# Patient Record
Sex: MALE | Race: WHITE | NOT HISPANIC OR LATINO
[De-identification: names, ages, dates, MRNs, and addresses within clinical notes are randomized per-mention and may not be internally consistent; named-entity substitution may affect disease eponyms.]

---

## 2017-07-12 ENCOUNTER — APPOINTMENT (OUTPATIENT)
Dept: GASTROENTEROLOGY | Facility: CLINIC | Age: 57
End: 2017-07-12

## 2017-07-12 VITALS
WEIGHT: 176 LBS | SYSTOLIC BLOOD PRESSURE: 110 MMHG | HEART RATE: 71 BPM | TEMPERATURE: 97.6 F | BODY MASS INDEX: 24.55 KG/M2 | DIASTOLIC BLOOD PRESSURE: 70 MMHG | OXYGEN SATURATION: 97 %

## 2017-07-12 RX ORDER — POLYETHYLENE GLYCOL 3350, SODIUM SULFATE, SODIUM CHLORIDE, POTASSIUM CHLORIDE, ASCORBIC ACID, SODIUM ASCORBATE 7.5-2.691G
100 KIT ORAL
Qty: 1 | Refills: 0 | Status: ACTIVE | COMMUNITY
Start: 2017-07-12 | End: 1900-01-01

## 2017-10-26 ENCOUNTER — OUTPATIENT (OUTPATIENT)
Dept: OUTPATIENT SERVICES | Facility: HOSPITAL | Age: 57
LOS: 1 days | Discharge: HOME | End: 2017-10-26
Payer: MEDICAID

## 2017-10-26 ENCOUNTER — APPOINTMENT (OUTPATIENT)
Dept: GASTROENTEROLOGY | Facility: HOSPITAL | Age: 57
End: 2017-10-26

## 2017-10-26 ENCOUNTER — RESULT REVIEW (OUTPATIENT)
Age: 57
End: 2017-10-26

## 2017-10-26 DIAGNOSIS — K51.90 ULCERATIVE COLITIS, UNSPECIFIED, WITHOUT COMPLICATIONS: ICD-10-CM

## 2017-10-26 PROCEDURE — 45380 COLONOSCOPY AND BIOPSY: CPT | Mod: GC

## 2017-10-26 PROCEDURE — 88305 TISSUE EXAM BY PATHOLOGIST: CPT | Mod: 26

## 2017-10-30 LAB — SURGICAL PATHOLOGY STUDY: SIGNIFICANT CHANGE UP

## 2018-07-18 ENCOUNTER — RX RENEWAL (OUTPATIENT)
Age: 58
End: 2018-07-18

## 2018-10-18 ENCOUNTER — CHART COPY (OUTPATIENT)
Age: 58
End: 2018-10-18

## 2018-10-18 ENCOUNTER — MEDICATION RENEWAL (OUTPATIENT)
Age: 58
End: 2018-10-18

## 2018-10-18 ENCOUNTER — MESSAGE (OUTPATIENT)
Age: 58
End: 2018-10-18

## 2018-10-18 RX ORDER — MESALAMINE 375 MG/1
0.38 CAPSULE, EXTENDED RELEASE ORAL DAILY
Qty: 360 | Refills: 3 | Status: ACTIVE | COMMUNITY
Start: 2017-07-12 | End: 1900-01-01

## 2019-02-27 ENCOUNTER — APPOINTMENT (OUTPATIENT)
Dept: GASTROENTEROLOGY | Facility: CLINIC | Age: 59
End: 2019-02-27
Payer: MEDICAID

## 2019-02-27 VITALS
HEART RATE: 65 BPM | WEIGHT: 185 LBS | DIASTOLIC BLOOD PRESSURE: 62 MMHG | TEMPERATURE: 98 F | RESPIRATION RATE: 12 BRPM | HEIGHT: 71 IN | BODY MASS INDEX: 25.9 KG/M2 | SYSTOLIC BLOOD PRESSURE: 120 MMHG | OXYGEN SATURATION: 97 %

## 2019-02-27 PROCEDURE — 99214 OFFICE O/P EST MOD 30 MIN: CPT

## 2019-02-27 RX ORDER — MESALAMINE 375 MG/1
0.38 CAPSULE, EXTENDED RELEASE ORAL DAILY
Qty: 360 | Refills: 1 | Status: ACTIVE | COMMUNITY
Start: 2019-02-27 | End: 1900-01-01

## 2019-02-27 NOTE — REASON FOR VISIT
[Follow-Up: _____] : a [unfilled] follow-up visit [FreeTextEntry1] : for followup and treatment of ulcerative colitis.

## 2019-02-27 NOTE — PHYSICAL EXAM
[General Appearance - Alert] : alert [General Appearance - In No Acute Distress] : in no acute distress [General Appearance - Well Nourished] : well nourished [General Appearance - Well Developed] : well developed [General Appearance - Well-Appearing] : healthy appearing [Sclera] : the sclera and conjunctiva were normal [Oropharynx] : the oropharynx was normal [Neck Appearance] : the appearance of the neck was normal [Neck Cervical Mass (___cm)] : no neck mass was observed [Respiration, Rhythm And Depth] : normal respiratory rhythm and effort [Exaggerated Use Of Accessory Muscles For Inspiration] : no accessory muscle use [Auscultation Breath Sounds / Voice Sounds] : lungs were clear to auscultation bilaterally [Heart Rate And Rhythm] : heart rate was normal and rhythm regular [Heart Sounds] : normal S1 and S2 [Heart Sounds Gallop] : no gallops [Murmurs] : no murmurs [Heart Sounds Pericardial Friction Rub] : no pericardial rub [Edema] : there was no peripheral edema [Bowel Sounds] : normal bowel sounds [Abdomen Soft] : soft [Abdomen Tenderness] : non-tender [] : no hepato-splenomegaly [Abdomen Mass (___ Cm)] : no abdominal mass palpated [Abdomen Hernia] : no hernia was discovered [Cervical Lymph Nodes Enlarged Posterior Bilaterally] : posterior cervical [Cervical Lymph Nodes Enlarged Anterior Bilaterally] : anterior cervical [Supraclavicular Lymph Nodes Enlarged Bilaterally] : supraclavicular [No CVA Tenderness] : no ~M costovertebral angle tenderness [No Spinal Tenderness] : no spinal tenderness [Abnormal Walk] : normal gait [Nail Clubbing] : no clubbing  or cyanosis of the fingernails [Skin Color & Pigmentation] : normal skin color and pigmentation [Oriented To Time, Place, And Person] : oriented to person, place, and time [Impaired Insight] : insight and judgment were intact [Affect] : the affect was normal [Mood] : the mood was normal

## 2019-02-27 NOTE — ASSESSMENT
[FreeTextEntry1] : Impression:\par \par #1 ulcerative colitis- history of 34 years duration. History suggestive of mild ulcerative colitis. Never hospitalized. Never required steroids. Surveillance colonoscopy on 10/26/17 revealed normal colonic mucosa except for minimal focal superficial ulceration of the transverse colon. Currently asymptomatic and in remission.\par \par #2 status post RIH repair.

## 2019-02-27 NOTE — CONSULT LETTER
[Dear  ___] : Dear  [unfilled], [Consult Letter:] : I had the pleasure of evaluating your patient, [unfilled]. [Please see my note below.] : Please see my note below. [Consult Closing:] : Thank you very much for allowing me to participate in the care of this patient.  If you have any questions, please do not hesitate to contact me. [Sincerely,] : Sincerely, [Prem Gregory M.D.] : Prem Gregory M.D. [Division of Gastroenterology] : Division of Gastroenterology [[facility]] : [facility] [[address] : [address] [[place] [address]] : [place] [address] [FreeTextEntry2] : Dr. DANG Farias [FreeTextEntry3] : Prem Gregory M.D.

## 2019-02-27 NOTE — HISTORY OF PRESENT ILLNESS
[FreeTextEntry1] : Office revisit on 2/27/19.\par \par PREVIOUS HISTORY:\par \par     -History of ulcerative colitis of 30 years duration. Previously under the care of Dr. Kirk Garcia. Prompted by changes in his insurance patient now seeks followup under my care. Await records.\par     -Patient unclear as to extend of ulcerative colitis. Never hospitalized. Never on prednisone. Recalls no topical therapy. Previously on Asacol 8 mg t.i.d. Currently on Apriso 4 tabs daily.\par     -Describes last flare approximately 18 months ago. Experienced lower abdominal pain, cramps, loose stool and diarrhea but no blood. No change in treatment. Symptoms resolved.\par \par Had colonoscopy in 2012. Apparently some type of abnormal finding prompting consultation by Dr. Hernandez Santiago who did a chromoendoscopy. Patient unclear as to what the issue was-??dysplasia. Patient reports findings at followup colonoscopy were negative and no other intervention was recommended.\par \par Currently asymptomatic. Stable appetite and weight. Has 2-3 formed bowel movements daily without any complaints of diarrhea, constipation, change in bowel habit, melena, hematochezia or rectal bleeding.\par \par Denies oral ulcers, fever, dysphagia, heartburn, nausea, vomiting or abdominal pain.\par \par Denies any history of gallstones or kidney stones.\par \par Denies any history of extraintestinal manifestations.\par \par INTERVAL HISTORY:\par \par Currently on maintenance therapy with Apriso 4 tabs daily.\par \par Patient feels well. Asymptomatic from a GI standpoint. Has one to 2 formed bowel movements daily without any complaints of diarrhea, constipation, change in bowel habit or rectal bleeding. Appetite and weight are stable. Denies abdominal pain.\par \par Surveillance colonoscopy performed on 4/6/15. Colonic mucosa normal throughout except for focal superficial ulceration in sigmoid colon. Biopsies of the cecum were normal. Ascending colon biopsy revealed chronic colitis with focal activity. Hepatic flexure biopsies revealed chronic inactive colitis as a did biopsies in the transverse colon and sigmoid colon. Sigmoid colon biopsy revealed chronic active colitis with ulceration. Rectal biopsy revealed chronic inactive proctitis. Dysplasia was not detected at any site.\par \par Patient reports no new or interval change to medical history or medications.\par \par CURRENT HISTORY:\par \par ORV 7/12/17:    -Routine followup regarding history of ulcerative colitis.\par                           -Asymptomatic. He feels fine. Has one to 2 formed bowel movements daily without any complaints of diarrhea, constipation, change in bowel habit or rectal bleeding. Appetite and weight are stable. Denies any complaint of dysphagia, heartburn, nausea, vomiting or abdominal pain.\par                           -Patient is on Apriso 4 times q.d.\par                           -Patient reports normal labs recently obtained by PMD (Dr. DANG Farias [phone]).\par \par COLONOSCOPY 10/26/17:    -Surveillance colonoscopy was performed in 10/26/17. History of ulcerative colitis of 32 years duration. Total colonoscopy was performed to the cecum with ileoscopy. Normal terminal ileum. Normal colonic mucosa throughout except for minimal focal superficial ulceration in the transverse colon. Colonic biopsies from the ascending colon, transverse colon, descending colon, sigmoid colon and rectum were all normal without active colitis except for mildly active colitis focally noted in the transverse colon. Dysplasia was not detected in the site. Followup surveillance colonoscopy is advised in 2 years. \par \par ORV 2/27/19:    -Patient presents for office revisit regarding history of ulcerative colitis. Feels fine. Asymptomatic from a GI standpoint. He has one to 2 formed bowel movements daily without any complaints of diarrhea, constipation, change in bowel habit or rectal bleeding. Appetite and weight are stable. Denies any complaints of dysphagia, heartburn, nausea, vomiting or abdominal pain. Patient remains on maintenance Apriso 4 tabs q.d. Patient indicates that he had a routine followup appointment with his PMD one month ago at which time routine labs were normal.\par \par

## 2019-07-09 ENCOUNTER — MEDICATION RENEWAL (OUTPATIENT)
Age: 59
End: 2019-07-09

## 2019-07-09 RX ORDER — MESALAMINE 375 MG/1
0.38 CAPSULE, EXTENDED RELEASE ORAL DAILY
Qty: 360 | Refills: 1 | Status: ACTIVE | COMMUNITY
Start: 2019-07-09 | End: 1900-01-01

## 2019-08-28 ENCOUNTER — APPOINTMENT (OUTPATIENT)
Dept: GASTROENTEROLOGY | Facility: CLINIC | Age: 59
End: 2019-08-28
Payer: MEDICAID

## 2019-08-28 VITALS
HEIGHT: 71 IN | TEMPERATURE: 98.8 F | RESPIRATION RATE: 12 BRPM | OXYGEN SATURATION: 98 % | DIASTOLIC BLOOD PRESSURE: 80 MMHG | HEART RATE: 77 BPM | WEIGHT: 184 LBS | BODY MASS INDEX: 25.76 KG/M2 | SYSTOLIC BLOOD PRESSURE: 120 MMHG

## 2019-08-28 PROCEDURE — 99213 OFFICE O/P EST LOW 20 MIN: CPT

## 2019-08-28 RX ORDER — POLYETHYLENE GLYCOL 3350, SODIUM SULFATE, SODIUM CHLORIDE, POTASSIUM CHLORIDE, ASCORBIC ACID, SODIUM ASCORBATE 7.5-2.691G
100 KIT ORAL
Qty: 1 | Refills: 0 | Status: ACTIVE | COMMUNITY
Start: 2019-08-28 | End: 1900-01-01

## 2019-08-28 NOTE — CONSULT LETTER
[Dear  ___] : Dear  [unfilled], [Consult Letter:] : I had the pleasure of evaluating your patient, [unfilled]. [Please see my note below.] : Please see my note below. [Consult Closing:] : Thank you very much for allowing me to participate in the care of this patient.  If you have any questions, please do not hesitate to contact me. [Sincerely,] : Sincerely, [Prem Gregory M.D.] : Prem Gregory M.D. [Division of Gastroenterology] : Division of Gastroenterology [[facility]] : [facility] [[address] : [address] [[place], [place] [address]] : [place] [address] [FreeTextEntry2] : Dr. DANG Farias [FreeTextEntry3] : Prem Gregory M.D.

## 2019-08-28 NOTE — PHYSICAL EXAM
[General Appearance - Alert] : alert [General Appearance - In No Acute Distress] : in no acute distress [General Appearance - Well Nourished] : well nourished [General Appearance - Well Developed] : well developed [General Appearance - Well-Appearing] : healthy appearing [Sclera] : the sclera and conjunctiva were normal [Oropharynx] : the oropharynx was normal [Neck Appearance] : the appearance of the neck was normal [Neck Cervical Mass (___cm)] : no neck mass was observed [Exaggerated Use Of Accessory Muscles For Inspiration] : no accessory muscle use [Respiration, Rhythm And Depth] : normal respiratory rhythm and effort [Auscultation Breath Sounds / Voice Sounds] : lungs were clear to auscultation bilaterally [Heart Rate And Rhythm] : heart rate was normal and rhythm regular [Heart Sounds] : normal S1 and S2 [Heart Sounds Gallop] : no gallops [Murmurs] : no murmurs [Heart Sounds Pericardial Friction Rub] : no pericardial rub [Edema] : there was no peripheral edema [Bowel Sounds] : normal bowel sounds [Abdomen Tenderness] : non-tender [Abdomen Soft] : soft [] : no hepato-splenomegaly [Abdomen Mass (___ Cm)] : no abdominal mass palpated [Abdomen Hernia] : no hernia was discovered [Cervical Lymph Nodes Enlarged Posterior Bilaterally] : posterior cervical [Cervical Lymph Nodes Enlarged Anterior Bilaterally] : anterior cervical [Supraclavicular Lymph Nodes Enlarged Bilaterally] : supraclavicular [Abnormal Walk] : normal gait [Nail Clubbing] : no clubbing  or cyanosis of the fingernails [Skin Color & Pigmentation] : normal skin color and pigmentation [Oriented To Time, Place, And Person] : oriented to person, place, and time [Impaired Insight] : insight and judgment were intact [Affect] : the affect was normal [Mood] : the mood was normal

## 2019-08-28 NOTE — HISTORY OF PRESENT ILLNESS
[FreeTextEntry1] : Office revisit on 8/28/19.\par \par Patient presents for GI followup regarding history of ulcerative colitis.\par \par PREVIOUS HISTORY:\par \par     -History of ulcerative colitis of 30 years duration. Previously under the care of Dr. Kirk Garcia. Prompted by changes in his insurance patient now seeks followup under my care. Await records.\par     -Patient unclear as to extend of ulcerative colitis. Never hospitalized. Never on prednisone. Recalls no topical therapy. Previously on Asacol 8 mg t.i.d. Currently on Apriso 4 tabs daily.\par     -Describes last flare approximately 18 months ago. Experienced lower abdominal pain, cramps, loose stool and diarrhea but no blood. No change in treatment. Symptoms resolved.\par \par Had colonoscopy in 2012. Apparently some type of abnormal finding prompting consultation by Dr. Hernandez Santiago who did a chromoendoscopy. Patient unclear as to what the issue was-??dysplasia. Patient reports findings at followup colonoscopy were negative and no other intervention was recommended.\par \par Currently asymptomatic. Stable appetite and weight. Has 2-3 formed bowel movements daily without any complaints of diarrhea, constipation, change in bowel habit, melena, hematochezia or rectal bleeding.\par \par Denies oral ulcers, fever, dysphagia, heartburn, nausea, vomiting or abdominal pain.\par \par Denies any history of gallstones or kidney stones.\par \par Denies any history of extraintestinal manifestations.\par \par INTERVAL HISTORY:\par \par Surveillance colonoscopy performed on 4/6/15. Colonic mucosa normal throughout except for focal superficial ulceration in sigmoid colon. Biopsies of the cecum were normal. Ascending colon biopsy revealed chronic colitis with focal activity. Hepatic flexure biopsies revealed chronic inactive colitis as a did biopsies in the transverse colon and sigmoid colon. Sigmoid colon biopsy revealed chronic active colitis with ulceration. Rectal biopsy revealed chronic inactive proctitis. Dysplasia was not detected at any site.\par \par CURRENT HISTORY:\par \par ORV 7/12/17:    -Routine followup regarding history of ulcerative colitis.\par                           -Asymptomatic. He feels fine. Has one to 2 formed bowel movements daily without any complaints of diarrhea, constipation, change in bowel habit or rectal bleeding. Appetite and weight are stable. Denies any complaint of dysphagia, heartburn, nausea, vomiting or abdominal pain.\par                           -Patient is on Apriso 4 times q.d.\par                           -Patient reports normal labs recently obtained by PMD (Dr. DANG Farias [phone]).\par \par COLONOSCOPY 10/26/17:    -Surveillance colonoscopy was performed in 10/26/17. History of ulcerative colitis of 32 years duration. Total colonoscopy was performed to the cecum with ileoscopy. Normal terminal ileum. Normal colonic mucosa throughout except for minimal focal superficial ulceration in the transverse colon. Colonic biopsies from the ascending colon, transverse colon, descending colon, sigmoid colon and rectum were all normal without active colitis except for mildly active colitis focally noted in the transverse colon. Dysplasia was not detected in the site. Followup surveillance colonoscopy is advised in 2 years. \par \par ORV 2/27/19:    -Patient presents for office revisit regarding history of ulcerative colitis. Feels fine. Asymptomatic from a GI standpoint. He has one to 2 formed bowel movements daily without any complaints of diarrhea, constipation, change in bowel habit or rectal bleeding. Appetite and weight are stable. Denies any complaints of dysphagia, heartburn, nausea, vomiting or abdominal pain. Patient remains on maintenance Apriso 4 tabs q.d. Patient indicates that he had a routine followup appointment with his PMD one month ago at which time routine labs were normal.\par \par ORV 8/28/19:      -Patient presents for followup regarding history of ulcerative colitis. He feels fine. Currently asymptomatic. Appetite and weight are stable. Denies any complaints of dysphagia, heartburn, nausea, vomiting or abdominal pain. He has one to 2 formed bowel movements daily and reports no complaints of any diarrhea, constipation, change in bowel habit or rectal bleeding. The patient indicates normal routine labs by his PMD several weeks ago. He is on maintenance Apriso 4 tabs q.d.

## 2019-12-02 ENCOUNTER — APPOINTMENT (OUTPATIENT)
Dept: GASTROENTEROLOGY | Facility: HOSPITAL | Age: 59
End: 2019-12-02

## 2019-12-02 ENCOUNTER — OUTPATIENT (OUTPATIENT)
Dept: OUTPATIENT SERVICES | Facility: HOSPITAL | Age: 59
LOS: 1 days | Discharge: HOME | End: 2019-12-02
Payer: MEDICAID

## 2019-12-02 ENCOUNTER — RESULT REVIEW (OUTPATIENT)
Age: 59
End: 2019-12-02

## 2019-12-02 VITALS
TEMPERATURE: 98 F | SYSTOLIC BLOOD PRESSURE: 126 MMHG | WEIGHT: 184.97 LBS | RESPIRATION RATE: 16 BRPM | DIASTOLIC BLOOD PRESSURE: 84 MMHG | OXYGEN SATURATION: 96 % | HEART RATE: 79 BPM | HEIGHT: 71 IN

## 2019-12-02 VITALS
HEART RATE: 76 BPM | OXYGEN SATURATION: 97 % | RESPIRATION RATE: 15 BRPM | SYSTOLIC BLOOD PRESSURE: 118 MMHG | DIASTOLIC BLOOD PRESSURE: 80 MMHG

## 2019-12-02 DIAGNOSIS — Z98.890 OTHER SPECIFIED POSTPROCEDURAL STATES: Chronic | ICD-10-CM

## 2019-12-02 DIAGNOSIS — K51.90 ULCERATIVE COLITIS, UNSPECIFIED, WITHOUT COMPLICATIONS: ICD-10-CM

## 2019-12-02 PROCEDURE — 45380 COLONOSCOPY AND BIOPSY: CPT

## 2019-12-02 PROCEDURE — 88305 TISSUE EXAM BY PATHOLOGIST: CPT | Mod: 26

## 2019-12-02 RX ORDER — SODIUM CHLORIDE 9 MG/ML
1000 INJECTION, SOLUTION INTRAVENOUS
Refills: 0 | Status: DISCONTINUED | OUTPATIENT
Start: 2019-12-02 | End: 2019-12-18

## 2019-12-02 RX ADMIN — SODIUM CHLORIDE 30 MILLILITER(S): 9 INJECTION, SOLUTION INTRAVENOUS at 09:36

## 2019-12-05 LAB — SURGICAL PATHOLOGY STUDY: SIGNIFICANT CHANGE UP

## 2019-12-22 ENCOUNTER — MOBILE ON CALL (OUTPATIENT)
Age: 59
End: 2019-12-22

## 2020-01-27 LAB
25(OH)D3 SERPL-MCNC: 27.8 NG/ML
BASOPHILS # BLD AUTO: 0.06 K/UL
BASOPHILS NFR BLD AUTO: 0.9 %
CALPROTECTIN FECAL: 146 UG/G
CRP SERPL-MCNC: 0.39 MG/DL
EOSINOPHIL # BLD AUTO: 0.06 K/UL
EOSINOPHIL NFR BLD AUTO: 0.9 %
ERYTHROCYTE [SEDIMENTATION RATE] IN BLOOD BY WESTERGREN METHOD: 35 MM/HR
HCT VFR BLD CALC: 45.5 %
HGB BLD-MCNC: 13.6 G/DL
IMM GRANULOCYTES NFR BLD AUTO: 0.4 %
LYMPHOCYTES # BLD AUTO: 2.42 K/UL
LYMPHOCYTES NFR BLD AUTO: 35.1 %
MAN DIFF?: NORMAL
MCHC RBC-ENTMCNC: 20.2 PG
MCHC RBC-ENTMCNC: 29.9 GM/DL
MCV RBC AUTO: 67.5 FL
MONOCYTES # BLD AUTO: 0.5 K/UL
MONOCYTES NFR BLD AUTO: 7.2 %
NEUTROPHILS # BLD AUTO: 3.83 K/UL
NEUTROPHILS NFR BLD AUTO: 55.5 %
PLATELET # BLD AUTO: 252 K/UL
RBC # BLD: 6.74 M/UL
RBC # FLD: 17.1 %
WBC # FLD AUTO: 6.9 K/UL

## 2020-09-18 ENCOUNTER — RX RENEWAL (OUTPATIENT)
Age: 60
End: 2020-09-18

## 2021-09-22 ENCOUNTER — NON-APPOINTMENT (OUTPATIENT)
Age: 61
End: 2021-09-22

## 2021-09-22 ENCOUNTER — RX RENEWAL (OUTPATIENT)
Age: 61
End: 2021-09-22

## 2021-09-22 RX ORDER — MESALAMINE 0.38 G/1
0.38 CAPSULE, EXTENDED RELEASE ORAL
Qty: 360 | Refills: 1 | Status: ACTIVE | COMMUNITY
Start: 2020-09-18 | End: 1900-01-01

## 2021-10-04 ENCOUNTER — LABORATORY RESULT (OUTPATIENT)
Age: 61
End: 2021-10-04

## 2021-10-05 LAB
25(OH)D3 SERPL-MCNC: 36.6 NG/ML
ALBUMIN SERPL ELPH-MCNC: 4.7 G/DL
ALP BLD-CCNC: 55 U/L
ALT SERPL-CCNC: 37 U/L
ANION GAP SERPL CALC-SCNC: 13 MMOL/L
AST SERPL-CCNC: 20 U/L
BASOPHILS # BLD AUTO: 0.05 K/UL
BASOPHILS NFR BLD AUTO: 0.8 %
BILIRUB SERPL-MCNC: 0.8 MG/DL
BUN SERPL-MCNC: 22 MG/DL
CALCIUM SERPL-MCNC: 9.6 MG/DL
CHLORIDE SERPL-SCNC: 105 MMOL/L
CO2 SERPL-SCNC: 24 MMOL/L
CREAT SERPL-MCNC: 0.88 MG/DL
CRP SERPL-MCNC: <3 MG/L
EOSINOPHIL # BLD AUTO: 0.06 K/UL
EOSINOPHIL NFR BLD AUTO: 0.9 %
ERYTHROCYTE [SEDIMENTATION RATE] IN BLOOD BY WESTERGREN METHOD: 39 MM/HR
GLUCOSE SERPL-MCNC: 92 MG/DL
HCT VFR BLD CALC: 47 %
HGB BLD-MCNC: 14.1 G/DL
IMM GRANULOCYTES NFR BLD AUTO: 0.5 %
LYMPHOCYTES # BLD AUTO: 2.27 K/UL
LYMPHOCYTES NFR BLD AUTO: 35.1 %
MAN DIFF?: NORMAL
MCHC RBC-ENTMCNC: 20.5 PG
MCHC RBC-ENTMCNC: 30 GM/DL
MCV RBC AUTO: 68.4 FL
MONOCYTES # BLD AUTO: 0.57 K/UL
MONOCYTES NFR BLD AUTO: 8.8 %
NEUTROPHILS # BLD AUTO: 3.49 K/UL
NEUTROPHILS NFR BLD AUTO: 53.9 %
PLATELET # BLD AUTO: 255 K/UL
POTASSIUM SERPL-SCNC: 5 MMOL/L
PROT SERPL-MCNC: 7.6 G/DL
RBC # BLD: 6.87 M/UL
RBC # FLD: 17.8 %
SODIUM SERPL-SCNC: 142 MMOL/L
WBC # FLD AUTO: 6.47 K/UL

## 2021-10-06 PROBLEM — K52.9 NONINFECTIVE GASTROENTERITIS AND COLITIS, UNSPECIFIED: Chronic | Status: ACTIVE | Noted: 2019-12-02

## 2022-03-02 ENCOUNTER — APPOINTMENT (OUTPATIENT)
Dept: GASTROENTEROLOGY | Facility: CLINIC | Age: 62
End: 2022-03-02
Payer: MEDICAID

## 2022-03-02 VITALS
OXYGEN SATURATION: 98 % | DIASTOLIC BLOOD PRESSURE: 80 MMHG | BODY MASS INDEX: 25.06 KG/M2 | HEIGHT: 72 IN | HEART RATE: 73 BPM | TEMPERATURE: 97.5 F | SYSTOLIC BLOOD PRESSURE: 138 MMHG | WEIGHT: 185 LBS

## 2022-03-02 PROCEDURE — 99213 OFFICE O/P EST LOW 20 MIN: CPT

## 2022-03-02 RX ORDER — MESALAMINE 0.38 G/1
0.38 CAPSULE, EXTENDED RELEASE ORAL
Qty: 360 | Refills: 3 | Status: ACTIVE | COMMUNITY
Start: 2022-03-02 | End: 1900-01-01

## 2022-03-02 RX ORDER — PEG-3350, SODIUM SULFATE, SODIUM CHLORIDE, POTASSIUM CHLORIDE, SODIUM ASCORBATE AND ASCORBIC ACID 7.5-2.691G
100 KIT ORAL
Qty: 1 | Refills: 0 | Status: ACTIVE | COMMUNITY
Start: 2022-03-02 | End: 1900-01-01

## 2022-03-02 NOTE — PHYSICAL EXAM
[General Appearance - Alert] : alert [General Appearance - In No Acute Distress] : in no acute distress [General Appearance - Well Nourished] : well nourished [General Appearance - Well Developed] : well developed [General Appearance - Well-Appearing] : healthy appearing [Sclera] : the sclera and conjunctiva were normal [Neck Appearance] : the appearance of the neck was normal [Neck Cervical Mass (___cm)] : no neck mass was observed [Respiration, Rhythm And Depth] : normal respiratory rhythm and effort [Exaggerated Use Of Accessory Muscles For Inspiration] : no accessory muscle use [Auscultation Breath Sounds / Voice Sounds] : lungs were clear to auscultation bilaterally [Heart Rate And Rhythm] : heart rate was normal and rhythm regular [Heart Sounds] : normal S1 and S2 [Heart Sounds Gallop] : no gallops [Murmurs] : no murmurs [Heart Sounds Pericardial Friction Rub] : no pericardial rub [Edema] : there was no peripheral edema [Bowel Sounds] : normal bowel sounds [Abdomen Soft] : soft [Abdomen Tenderness] : non-tender [] : no hepato-splenomegaly [Abdomen Mass (___ Cm)] : no abdominal mass palpated [Abdomen Hernia] : no hernia was discovered [Cervical Lymph Nodes Enlarged Posterior Bilaterally] : posterior cervical [Cervical Lymph Nodes Enlarged Anterior Bilaterally] : anterior cervical [Supraclavicular Lymph Nodes Enlarged Bilaterally] : supraclavicular [Abnormal Walk] : normal gait [Nail Clubbing] : no clubbing  or cyanosis of the fingernails [Skin Color & Pigmentation] : normal skin color and pigmentation [Oriented To Time, Place, And Person] : oriented to person, place, and time [Impaired Insight] : insight and judgment were intact [Affect] : the affect was normal [Mood] : the mood was normal [FreeTextEntry1] : Mild diastases recti is noted.

## 2022-03-02 NOTE — CONSULT LETTER
[Dear  ___] : Dear  [unfilled], [Consult Letter:] : I had the pleasure of evaluating your patient, [unfilled]. [Please see my note below.] : Please see my note below. [Consult Closing:] : Thank you very much for allowing me to participate in the care of this patient.  If you have any questions, please do not hesitate to contact me. [Sincerely,] : Sincerely, [FreeTextEntry2] : Dr. DANG Farias [FreeTextEntry3] : Prem Gregory M.D.

## 2022-03-02 NOTE — HISTORY OF PRESENT ILLNESS
[FreeTextEntry1] : Office revisit on 3/2/2022.\par \par Patient presents for GI followup regarding history of ulcerative colitis.\par \par PREVIOUS HISTORY:\par \par     -History of ulcerative colitis of 30 years duration. Previously under the care of Dr. Kirk Garcia. Prompted by changes in his insurance patient now seeks followup under my care. Await records.\par     -Patient unclear as to extend of ulcerative colitis. Never hospitalized. Never on prednisone. Recalls no topical therapy. Previously on Asacol 8 mg t.i.d. Currently on Apriso 4 tabs daily.\par     -Describes last flare approximately 18 months ago. Experienced lower abdominal pain, cramps, loose stool and diarrhea but no blood. No change in treatment. Symptoms resolved.\par \par Had colonoscopy in 2012. Apparently some type of abnormal finding prompting consultation by Dr. Hernandez Santiago who did a chromoendoscopy. Patient unclear as to what the issue was-??dysplasia. Patient reports findings at followup colonoscopy were negative and no other intervention was recommended.\par \par Currently asymptomatic. Stable appetite and weight. Has 2-3 formed bowel movements daily without any complaints of diarrhea, constipation, change in bowel habit, melena, hematochezia or rectal bleeding.\par \par Denies oral ulcers, fever, dysphagia, heartburn, nausea, vomiting or abdominal pain.\par \par Denies any history of gallstones or kidney stones.\par \par Denies any history of extraintestinal manifestations.\par \par INTERVAL HISTORY:\par \par Surveillance colonoscopy performed on 4/6/15. Colonic mucosa normal throughout except for focal superficial ulceration in sigmoid colon. Biopsies of the cecum were normal. Ascending colon biopsy revealed chronic colitis with focal activity. Hepatic flexure biopsies revealed chronic inactive colitis as a did biopsies in the transverse colon and sigmoid colon. Sigmoid colon biopsy revealed chronic active colitis with ulceration. Rectal biopsy revealed chronic inactive proctitis. Dysplasia was not detected at any site.\par \par CURRENT HISTORY:\par \par ORV 7/12/17:    -Routine followup regarding history of ulcerative colitis.\par                           -Asymptomatic. He feels fine. Has one to 2 formed bowel movements daily without any complaints of diarrhea, constipation, change in bowel habit or rectal bleeding. Appetite and weight are stable. Denies any complaint of dysphagia, heartburn, nausea, vomiting or abdominal pain.\par                           -Patient is on Apriso 4 times q.d.\par                           -Patient reports normal labs recently obtained by PMD (Dr. DANG Farias [phone]).\par \par COLONOSCOPY 10/26/17:    -Surveillance colonoscopy was performed in 10/26/17. History of ulcerative colitis of 32 years duration. Total colonoscopy was performed to the cecum with ileoscopy. Normal terminal ileum. Normal colonic mucosa throughout except for minimal focal superficial ulceration in the transverse colon. Colonic biopsies from the ascending colon, transverse colon, descending colon, sigmoid colon and rectum were all normal without active colitis except for mildly active colitis focally noted in the transverse colon. Dysplasia was not detected in the site. Followup surveillance colonoscopy is advised in 2 years. \par \par ORV 2/27/19:    -Patient presents for office revisit regarding history of ulcerative colitis. Feels fine. Asymptomatic from a GI standpoint. He has one to 2 formed bowel movements daily without any complaints of diarrhea, constipation, change in bowel habit or rectal bleeding. Appetite and weight are stable. Denies any complaints of dysphagia, heartburn, nausea, vomiting or abdominal pain. Patient remains on maintenance Apriso 4 tabs q.d. Patient indicates that he had a routine followup appointment with his PMD one month ago at which time routine labs were normal.\par \par ORV 8/28/19:      -Patient presents for followup regarding history of ulcerative colitis. He feels fine. Currently asymptomatic. Appetite and weight are stable. Denies any complaints of dysphagia, heartburn, nausea, vomiting or abdominal pain. He has one to 2 formed bowel movements daily and reports no complaints of any diarrhea, constipation, change in bowel habit or rectal bleeding. The patient indicates normal routine labs by his PMD several weeks ago. He is on maintenance Apriso 4 tabs q.d.\par \par COLONOSCOPY 12/2/2019:     -Surveillance colonoscopy was performed on 12/2/19. History of ulcerative colitis. Total colonoscopy was performed to the cecum with ileoscopy. Normal terminal ileum. Mildly active colitis was noted mostly in the region of the cecum, ascending colon and transverse colon with associated superficial ulceration, scant exudate and erythema. Focal sparing and skip areas noted. No friability. Descending colon normal. A few superficial ulcers noted in sigmoid colon with normal interval mucosa. Mild anal stenosis noted. Biopsy of the cecum, ascending colon and transverse colon was noted for chronic active colitis. Descending colon biopsies was normal. Sigmoid biopsy noted for chronic active colitis. Rectum biopsy without active colitis. Dysplasia not detected at any site. As noted, history of ulcerative colitis. Current exam noted for active colitis with question as to Crohn's disease in view of skip areas and focal and segmental changes noted. Surveillance colonoscopy advised in one year.\par \par ORV 3/2/2022:     -Presents for follow-up regarding history of ulcerative colitis.  Feels well at current time.  On maintenance mesalamine 4 tabs daily (0.375 g/tab).  Typically has 2-3 formed Seltzer 3 bowel movements daily without any current complaints of diarrhea, constipation, change in bowel habit or rectal bleeding.  Indicate he might have a loose stool approximately once per month but self-limited and not protracted.  Denies fever.  Appetite and weight are stable.  Denies complaints of dysphagia, heartburn, nausea, vomiting or abdominal pain.\par                             -At last assessment of 10/4/2021 CBCs/platelets were normal including hemoglobin 14.1 and hematocrit 47.  CMP was normal including normal liver enzymes with ALT 37.  CRP <3 and ESR was 37.\par

## 2022-03-02 NOTE — ASSESSMENT
[FreeTextEntry1] : Impression:\par \par #1 ulcerative colitis- history of 37 years duration. History suggestive of mild ulcerative colitis. Never hospitalized. Never required steroids. Surveillance colonoscopy on 12/2/2019 was noted for mildly active colitis primarily in region of cecum, ascending colon and transverse colon.  Currently asymptomatic and in clinical remission.\par \par #2 status post RIH repair. I have personally performed a face to face diagnostic evaluation on this patient. I have reviewed the ACP note and agree with the history, exam and plan of care, except as noted.

## 2022-03-12 LAB
ALBUMIN SERPL ELPH-MCNC: 4.6 G/DL
ALP BLD-CCNC: 60 U/L
ALT SERPL-CCNC: 47 U/L
AST SERPL-CCNC: 29 U/L
BASOPHILS # BLD AUTO: 0.05 K/UL
BASOPHILS NFR BLD AUTO: 0.7 %
BILIRUB DIRECT SERPL-MCNC: 0.2 MG/DL
BILIRUB INDIRECT SERPL-MCNC: 0.5 MG/DL
BILIRUB SERPL-MCNC: 0.6 MG/DL
CALPROTECTIN FECAL: 1111 UG/G
CRP SERPL-MCNC: 17 MG/L
EOSINOPHIL # BLD AUTO: 0.07 K/UL
EOSINOPHIL NFR BLD AUTO: 1 %
HCT VFR BLD CALC: 44.5 %
HGB BLD-MCNC: 13.2 G/DL
IMM GRANULOCYTES NFR BLD AUTO: 0.3 %
LYMPHOCYTES # BLD AUTO: 2.04 K/UL
LYMPHOCYTES NFR BLD AUTO: 30 %
MAN DIFF?: NORMAL
MCHC RBC-ENTMCNC: 20.3 PG
MCHC RBC-ENTMCNC: 29.7 GM/DL
MCV RBC AUTO: 68.6 FL
MONOCYTES # BLD AUTO: 0.63 K/UL
MONOCYTES NFR BLD AUTO: 9.3 %
NEUTROPHILS # BLD AUTO: 3.98 K/UL
NEUTROPHILS NFR BLD AUTO: 58.7 %
PLATELET # BLD AUTO: 270 K/UL
PROT SERPL-MCNC: 7.5 G/DL
RBC # BLD: 6.49 M/UL
RBC # FLD: 17.4 %
WBC # FLD AUTO: 6.79 K/UL

## 2022-03-12 RX ORDER — PEG-3350, SODIUM SULFATE, SODIUM CHLORIDE, POTASSIUM CHLORIDE, SODIUM ASCORBATE AND ASCORBIC ACID 7.5-2.691G
100 KIT ORAL
Qty: 1 | Refills: 0 | Status: ACTIVE | COMMUNITY
Start: 2022-03-12 | End: 1900-01-01

## 2022-03-16 LAB
C DIFF TOX GENS STL QL NAA+PROBE: NORMAL
CDIFF BY PCR: NOT DETECTED
GI PCR PANEL, STOOL: NORMAL

## 2022-03-17 LAB — BACTERIA STL CULT: NORMAL

## 2022-03-18 ENCOUNTER — NON-APPOINTMENT (OUTPATIENT)
Age: 62
End: 2022-03-18

## 2022-03-18 DIAGNOSIS — Z12.11 ENCOUNTER FOR SCREENING FOR MALIGNANT NEOPLASM OF COLON: ICD-10-CM

## 2022-03-18 RX ORDER — POLYETHYLENE GLYCOL-3350 AND ELECTROLYTES 236; 6.74; 5.86; 2.97; 22.74 G/274.31G; G/274.31G; G/274.31G; G/274.31G; G/274.31G
236 POWDER, FOR SOLUTION ORAL
Qty: 1 | Refills: 0 | Status: ACTIVE | COMMUNITY
Start: 2022-03-18 | End: 1900-01-01

## 2022-05-02 LAB — SARS-COV-2 N GENE NPH QL NAA+PROBE: NOT DETECTED

## 2022-05-03 ENCOUNTER — APPOINTMENT (OUTPATIENT)
Dept: GASTROENTEROLOGY | Facility: HOSPITAL | Age: 62
End: 2022-05-03

## 2022-05-03 ENCOUNTER — RESULT REVIEW (OUTPATIENT)
Age: 62
End: 2022-05-03

## 2022-05-03 ENCOUNTER — OUTPATIENT (OUTPATIENT)
Dept: OUTPATIENT SERVICES | Facility: HOSPITAL | Age: 62
LOS: 1 days | Discharge: HOME | End: 2022-05-03
Payer: MEDICAID

## 2022-05-03 VITALS
DIASTOLIC BLOOD PRESSURE: 77 MMHG | HEART RATE: 69 BPM | SYSTOLIC BLOOD PRESSURE: 114 MMHG | RESPIRATION RATE: 14 BRPM | OXYGEN SATURATION: 97 %

## 2022-05-03 VITALS
HEART RATE: 82 BPM | RESPIRATION RATE: 14 BRPM | OXYGEN SATURATION: 97 % | WEIGHT: 184.97 LBS | SYSTOLIC BLOOD PRESSURE: 126 MMHG | DIASTOLIC BLOOD PRESSURE: 83 MMHG | TEMPERATURE: 98 F | HEIGHT: 72 IN

## 2022-05-03 DIAGNOSIS — K51.90 ULCERATIVE COLITIS, UNSPECIFIED, WITHOUT COMPLICATIONS: ICD-10-CM

## 2022-05-03 DIAGNOSIS — Z98.890 OTHER SPECIFIED POSTPROCEDURAL STATES: Chronic | ICD-10-CM

## 2022-05-03 PROCEDURE — 45380 COLONOSCOPY AND BIOPSY: CPT

## 2022-05-03 PROCEDURE — 88305 TISSUE EXAM BY PATHOLOGIST: CPT | Mod: 26

## 2022-05-03 NOTE — ASU PREOP CHECKLIST - LOOSE TEETH
Your Child's Health  18-Month-Old Visit      Chinyere Llanes  May 5, 2016    There were no vitals taken for this visit.  Weight:       NUTRITION:  Avoid snacks that cause cavities, including chewy fruit snacks and sugared cereals.  Children should drink between 16 and 24 ounces of milk per day for growth of bones and teeth.       Obesity and being overweight are serious problems in the [place].  You can help your child avoid these problems by following these guidelines:  1. Limit TV and video total time to 2 hours per day or less.  2. Don't encourage your children to eat if they tell you they are full.  Healthy children will not starve themselves.  3. Don't reward your children for cleaning their plates.  If they always leave food, reduce the size of the portions and tell them to ask for more if they are still hungry.  4. Don't allow children to dish out their own portions.  They will imitate adults or imitate what they have seen on TV; in both cases, the amount will be too large.  This results in increased calories and increased waste.  For children above 5 years of age and for adults, people eat more when given larger portions.  5. They should not have access to soda or junk food.      Bottle feeding past one year of age has been associated with an increase in tooth decay. Allowing the child to carry a bottle around and sip it is particularly harmful to teeth.  Although most dentists prefer not to see children until 3 years of age, ask your dentist office for advice.  Cleaning with a rag or soft brush is recommended by most dentists.    DEVELOPMENT: Children have many physical accomplishments at this age.  They can usually walk, climb, drink from a cup, remove clothes, and initiate physical tasks around the house.    LANGUAGE:  Most children at this age can say only a few words.  They will begin to acquire language mainly in the months ahead.  Chinyere may imitate car or animal noises before she starts to say  more \"traditional\" words.  You can assist Chinyere's language development in many ways:  1. Name the objects in the immediate area.  2. Name body parts.  3. Give Chinyere words for actions like \"jump,” \"eat,\" \"drink,\" and \"run.\"  4. Talk with Chinyere and listen to what she is trying to say.  5. Read to Chinyere. Even naming the objects in magazine ads will help.    TOILET TRAINING:   Readiness for toilet training is sometimes signaled by dryness after naps and the child's demands to be changed as soon as he or she is wet.  Some children express an interest as soon as 18 months, and it is OK to encourage them after that age.  The average age for training in the [place] however, is closer to 3 years.  This means that half of all children train later.  The most effective training methods involve praise and small rewards such as raisins or M and M's.  When Chinyere is older (past 4), special \"grownup\" pants may be an incentive.  If she is totally disinterested or resistant, it may be best to put training off for a few months; however, you should continue to say, \"Soon, when you are big, you will go on the potty\" whenever you change her.    TEMPER TANTRUMS:  These begin about the time a child starts walking and can continue to be a problem until the child is 3 1/2 years old or older.  The most effective method of dealing with tantrums is to ignore them and to pay more attention and give more praise when the child is behaving well.   Time-outs can be effective at this age.    CAR SAFETY:  An approved car seat in the back seat of the car is required by [place] law until your child is four years old and weighs at least 40 pounds.  Forward-facing seats are not recommended until your child is two years of age.    ACCIDENT PREVENTION:  1. Kitchen:  Keep dangerous items out of reach, including hot liquids, hot foods, and electrical cords on appliances such as irons, toasters, and coffee pots.  2. Upstairs Windows:  Use locks or  guards.  3. Bathtubs:  Do not leave Chinyere alone in the tub. Even babies who have had swimming classes are not safe alone in the tub at this age. Children have even drowned in buckets of water.  4. Water Safety:  Empty any buckets of water. Fence off permanent pools and drain wading pools when not in use.  Keep the toilet lid down.  5. Poisoning: Use safety caps on medicines.  Household  and polishes must be kept out of reach.  Store medicines and  out of sight.  Don't transfer medicines to non-childproofed or unlabeled containers.  Dispose of unused medications.  Be especially careful when visiting older persons or families without children in the house.  They may be in the habit of keeping their medications out on tables.  Syrup of Ipecac is no longer recommended to be kept in the home.  Please dispose of any you might have.  Call the clinic [phone] or the Poison Control Center at [phone] (long distance [phone]) for any known or suspected poisoning.    SMOKING:  Children exposed to tobacco smoke have more ear infections and pneumonia  Cold symptoms last longer. If you smoke, please quit. If you cannot quit, smoke outside. Do not smoke near Aria, and do not let others smoke near her.    LEAD EXPOSURE:  If there is lead in the house, Chinyere may be vulnerable.  Let us know if any of the following apply:  1. Your home was built before 1960 and has peeling or chipping or chalking paint.  Homes built in older cities at the turn of the last century may have lead pipes.  Check to see if any of the above applies to Chinyere's sitter's home, , , or any other house where she spends time.  2. You may have other sources of lead in the home, including:  (a) herbal remedies like jasper, casey, ghasard, kandu, azarcon, pay-loo-ah, or balagoli; (b) antique toys, especially those made of lead or pewter; (c) imported mini blinds; and (d) imported canned goods, especially acid foods like tomato  and citrus. Do not cook with or store foods in utensils made of crystal, pewter, or imported pottery.  3. You have another child or housemate who is being followed or treated for an elevated lead level.  4. Chinyere lives with an adult whose job or hobby involves lead exposure (soldering, battery manufacture, recycling, casting, stained glass, etc.).  5. You live near an active lead smelter, a battery recycling plant, or a plant that processes hot metal.    TUBERCULOSIS:  There is such a low rate of tuberculosis in our area that frequent skin tests are no longer recommended.  However, certain situations do increase Chinyere's risk, including contact with AIDS patients, nursing home residents, prisoners, immigrants, or homeless persons.  Please let us know if Chinyere has contacts with such persons, or if she has contact with anyone known to have or suspected of having tuberculosis.    SUN EXPOSURE:  If you plan to have Chinyere outside , please apply sunscreen.    MEDICATION FOR FEVER OR PAIN:   Acetaminophen liquid (e.g., Tylenol or Tempra) may be given every four hours as needed for pain or fever.  Acetaminophen liquid is less concentrated than the infant dropper bottle type.  Be sure to check which product CONCENTRATION you are using.    INFANT Tylenol/Acetaminophen  Drops (160 MG/5 mL)    Child’s Weight: Dose:  24 - 35 pounds:   160 mg (5.0 mL (1 Teaspoon))    CHILDREN’S Tylenol/Acetaminophen  (160 MG/5 mL)    Child’s Weight: Dose:  24 - 35 pounds:   160 mg (5.0 mL (1 Teaspoon))    CHILDREN'S Ibuprofen liquid (100MG/5mL) (e.g., Advil or Motrin) may be given every six hours as needed for pain or fever. Please check the concentration of your ibuprofen to be sure you are giving the correct amount.      Child’s Weight: Dose:  24 - 35 pounds:   100 mg (1 Teaspoon))    If Chinyere is outside this weight range, call your pediatrician's office for advice.    Most Recent Immunizations   Administered Date(s) Administered   • DTaP 02/03/2017    • DTaP/HIB/IPV 04/04/2016   • HEPATITIS A CHILD 02/03/2017   • HIB 10/21/2016   • Hepatitis B Child 04/04/2016   • Influenza Quadrivalent Preservative Free 02/03/2017   • Measles Mumps Rubella Varicella 10/21/2016   • Pneumococcal Conjugate 13 Valent 10/21/2016   • Rotavirus - Monovalent 02/01/2016       If Aria develops any of the following reactions within 72 hours after an immunization, notify your pediatrician by calling the pediatric phone nurse:  1. A temperature of 105 degrees or above.  2. More than 3 hours of continuous crying.  3. A shrill, high-pitched cry.  4. A pale, limp spell.  5. A seizure or fainting spell. In this case, you should call 911 or go immediately to the emergency room.    NEXT VISIT:  2 YEARS OF AGE    Thank you for entrusting your care to [facility].   no

## 2022-05-14 ENCOUNTER — NON-APPOINTMENT (OUTPATIENT)
Age: 62
End: 2022-05-14

## 2022-09-07 ENCOUNTER — LABORATORY RESULT (OUTPATIENT)
Age: 62
End: 2022-09-07

## 2022-09-07 ENCOUNTER — APPOINTMENT (OUTPATIENT)
Dept: GASTROENTEROLOGY | Facility: CLINIC | Age: 62
End: 2022-09-07

## 2022-09-07 VITALS
TEMPERATURE: 97.5 F | WEIGHT: 187.8 LBS | BODY MASS INDEX: 25.47 KG/M2 | HEART RATE: 70 BPM | OXYGEN SATURATION: 97 % | DIASTOLIC BLOOD PRESSURE: 72 MMHG | SYSTOLIC BLOOD PRESSURE: 114 MMHG

## 2022-09-07 PROCEDURE — 36415 COLL VENOUS BLD VENIPUNCTURE: CPT

## 2022-09-07 PROCEDURE — 99214 OFFICE O/P EST MOD 30 MIN: CPT | Mod: 25

## 2022-09-07 NOTE — ASSESSMENT
[FreeTextEntry1] : Impression:\par \par #1 ulcerative colitis- \par                -History of 37 years duration. History suggestive of mild ulcerative colitis. Never hospitalized. Never required steroids.  \par                -Episode of acute diarrhea 3/2022 inflammatory mediated (fecal calprotectin 1111)–unclear if flare UC versus acute self-limited diarrhea of infectious etiology in view of brief duration symptoms. \par                - Surveillance colonoscopy on 5/3/2022 was noted for mildly active colitis primarily in region of ascending colon and hepatic flexure.  Currently asymptomatic and in clinical remission.  However, not clear has achieved full mucosal healing.\par \par #2 status post RIH repair.

## 2022-09-07 NOTE — HISTORY OF PRESENT ILLNESS
[FreeTextEntry1] : Office revisit on 9/7/2022.\par \par Patient presents for GI followup regarding history of ulcerative colitis.\par \par PREVIOUS HISTORY:\par \par     -History of ulcerative colitis of 30 years duration. Previously under the care of Dr. Kirk Garcia. Prompted by changes in his insurance patient now seeks followup under my care. Await records.\par     -Patient unclear as to extend of ulcerative colitis. Never hospitalized. Never on prednisone. Recalls no topical therapy. Previously on Asacol 8 mg t.i.d. Currently on Apriso 4 tabs daily.\par     -Describes last flare approximately 18 months ago. Experienced lower abdominal pain, cramps, loose stool and diarrhea but no blood. No change in treatment. Symptoms resolved.\par \par Had colonoscopy in 2012. Apparently some type of abnormal finding prompting consultation by Dr. Hernandez Santiago who did a chromoendoscopy. Patient unclear as to what the issue was-??dysplasia. Patient reports findings at followup colonoscopy were negative and no other intervention was recommended.\par \par Currently asymptomatic. Stable appetite and weight. Has 2-3 formed bowel movements daily without any complaints of diarrhea, constipation, change in bowel habit, melena, hematochezia or rectal bleeding.\par \par Denies oral ulcers, fever, dysphagia, heartburn, nausea, vomiting or abdominal pain.\par \par Denies any history of gallstones or kidney stones.\par \par Denies any history of extraintestinal manifestations.\par \par INTERVAL HISTORY:\par \par Surveillance colonoscopy performed on 4/6/15. Colonic mucosa normal throughout except for focal superficial ulceration in sigmoid colon. Biopsies of the cecum were normal. Ascending colon biopsy revealed chronic colitis with focal activity. Hepatic flexure biopsies revealed chronic inactive colitis as a did biopsies in the transverse colon and sigmoid colon. Sigmoid colon biopsy revealed chronic active colitis with ulceration. Rectal biopsy revealed chronic inactive proctitis. Dysplasia was not detected at any site.\par \par CURRENT HISTORY:\par \par ORV 7/12/17:    -Routine followup regarding history of ulcerative colitis.\par                           -Asymptomatic. He feels fine. Has one to 2 formed bowel movements daily without any complaints of diarrhea, constipation, change in bowel habit or rectal bleeding. Appetite and weight are stable. Denies any complaint of dysphagia, heartburn, nausea, vomiting or abdominal pain.\par                           -Patient is on Apriso 4 times q.d.\par                           -Patient reports normal labs recently obtained by PMD (Dr. DANG Farias [phone]).\par \par COLONOSCOPY 10/26/17:    -Surveillance colonoscopy was performed in 10/26/17. History of ulcerative colitis of 32 years duration. Total colonoscopy was performed to the cecum with ileoscopy. Normal terminal ileum. Normal colonic mucosa throughout except for minimal focal superficial ulceration in the transverse colon. Colonic biopsies from the ascending colon, transverse colon, descending colon, sigmoid colon and rectum were all normal without active colitis except for mildly active colitis focally noted in the transverse colon. Dysplasia was not detected in the site. Followup surveillance colonoscopy is advised in 2 years. \par \par ORV 2/27/19:    -Patient presents for office revisit regarding history of ulcerative colitis. Feels fine. Asymptomatic from a GI standpoint. He has one to 2 formed bowel movements daily without any complaints of diarrhea, constipation, change in bowel habit or rectal bleeding. Appetite and weight are stable. Denies any complaints of dysphagia, heartburn, nausea, vomiting or abdominal pain. Patient remains on maintenance Apriso 4 tabs q.d. Patient indicates that he had a routine followup appointment with his PMD one month ago at which time routine labs were normal.\par \par ORV 8/28/19:      -Patient presents for followup regarding history of ulcerative colitis. He feels fine. Currently asymptomatic. Appetite and weight are stable. Denies any complaints of dysphagia, heartburn, nausea, vomiting or abdominal pain. He has one to 2 formed bowel movements daily and reports no complaints of any diarrhea, constipation, change in bowel habit or rectal bleeding. The patient indicates normal routine labs by his PMD several weeks ago. He is on maintenance Apriso 4 tabs q.d.\par \par COLONOSCOPY 12/2/2019:     -Surveillance colonoscopy was performed on 12/2/19. History of ulcerative colitis. Total colonoscopy was performed to the cecum with ileoscopy. Normal terminal ileum. Mildly active colitis was noted mostly in the region of the cecum, ascending colon and transverse colon with associated superficial ulceration, scant exudate and erythema. Focal sparing and skip areas noted. No friability. Descending colon normal. A few superficial ulcers noted in sigmoid colon with normal interval mucosa. Mild anal stenosis noted. Biopsy of the cecum, ascending colon and transverse colon was noted for chronic active colitis. Descending colon biopsies was normal. Sigmoid biopsy noted for chronic active colitis. Rectum biopsy without active colitis. Dysplasia not detected at any site. As noted, history of ulcerative colitis. Current exam noted for active colitis with question as to Crohn's disease in view of skip areas and focal and segmental changes noted. Surveillance colonoscopy advised in one year.\par \par ORV 3/2/2022:     -Presents for follow-up regarding history of ulcerative colitis.  Feels well at current time.  On maintenance mesalamine 4 tabs daily (0.375 g/tab).  Typically has 2-3 formed Boley 3 bowel movements daily without any current complaints of diarrhea, constipation, change in bowel habit or rectal bleeding.  Indicate he might have a loose stool approximately once per month but self-limited and not protracted.  Denies fever.  Appetite and weight are stable.  Denies complaints of dysphagia, heartburn, nausea, vomiting or abdominal pain.\par                             -At last assessment of 10/4/2021 CBCs/platelets were normal including hemoglobin 14.1 and hematocrit 47.  CMP was normal including normal liver enzymes with ALT 37.  CRP <3 and ESR was 37.\par \par COLONOSCOPY 5/3/2022:     -Surveillance colonoscopy was performed on 5/3/2022. History of ulcerative colitis diagnosed at approximately age 36. Total colonoscopy was performed to the cecum with ileoscopy. Normal terminal ileum. A mild segmental colitis was noted in the region of the mid and distal ascending colon and hepatic flexure with the appearance of mild erythema, punctate pinpoint ulcerations and blunted but not absent vascular markings. Superficial ulceration was focally noted at the hepatic flexure. The mucosa appeared normal in the cecum, transverse colon, descending colon, sigmoid colon and rectum. Biopsy of cecum revealed focal active colitis. Ascending colon and hepatic flexure biopsies revealed chronic active colitis. Transverse colon, descending colon, sigmoid colon and rectum biopsies either revealed normal findings or only chronic inactive colitis. Dysplasia was not detected at any site. Follow-up surveillance colonoscopy is advised in 2 years.\par                                                -Findings discussed with patient. He indicates feeling fine. He has 2 formed bowel movements daily without any complaints of diarrhea or rectal bleeding. He is currently on a regimen of mesalamine 4 tabs daily (0.375 g/tab). Current findings and presence of some mild focal active colitis discussed. Option of escalating to a biologic treatment regimen with consideration of Stelara, Entyvio or Zeposia was discussed. At present, patient does not wish to change his treatment. His patient preference is to maintain the status quo. I advised repeating a fecal calprotectin 7/2022 and recommended the patient make an office revisit appointment 9/2022. \par \par ORV 9/7/2022:     -Evaluated for follow-up regarding ulcerative colitis.  Currently feels fine.  Has 1-2 formed bowel movements daily and describes passage of formed Boley 3 or Boley 4 bowel movements without any current complaints of diarrhea or rectal bleeding.  Reports no fever.  Appetite and weight are stable.  Reports no complaints of dysphagia, nausea, vomiting or abdominal pain.  Currently on maintenance regimen of mesalamine 4 tabs daily (0.375 g/tab).\par                             -Had been evaluated for self-limited diarrhea 3/2022 at which time he was reporting 3-4 diarrheal stools daily in association with nausea.  Evaluation at that time noted for CRP 17 and fecal calprotectin 1111.  Stool studies for C. difficile, culture and GI PCR were negative.  Of note, these acute diarrheal symptoms subsided at that time.\par                             -As noted, colonoscopy 5/3/2022 noted for mild segmental colitis region mid and distal ascending colon and hepatic flexure with biopsy at those sites revealing chronic active colitis.  Mucosa at other sites healed without active colitis detected.  Dysplasia not detected at any site.

## 2022-09-09 LAB
ALBUMIN SERPL ELPH-MCNC: 4.6 G/DL
ALP BLD-CCNC: 62 U/L
ALT SERPL-CCNC: 25 U/L
ANION GAP SERPL CALC-SCNC: 18 MMOL/L
AST SERPL-CCNC: 22 U/L
BASOPHILS # BLD AUTO: 0.06 K/UL
BASOPHILS NFR BLD AUTO: 0.8 %
BILIRUB SERPL-MCNC: 0.3 MG/DL
BUN SERPL-MCNC: 17 MG/DL
CALCIUM SERPL-MCNC: 10.1 MG/DL
CHLORIDE SERPL-SCNC: 106 MMOL/L
CO2 SERPL-SCNC: 22 MMOL/L
CREAT SERPL-MCNC: 0.94 MG/DL
CRP SERPL-MCNC: 4 MG/L
EGFR: 92 ML/MIN/1.73M2
EOSINOPHIL # BLD AUTO: 0.08 K/UL
EOSINOPHIL NFR BLD AUTO: 1.1 %
ERYTHROCYTE [SEDIMENTATION RATE] IN BLOOD BY WESTERGREN METHOD: 46 MM/HR
GLUCOSE SERPL-MCNC: 79 MG/DL
HBV CORE IGG+IGM SER QL: NONREACTIVE
HBV SURFACE AB SER QL: NONREACTIVE
HBV SURFACE AG SER QL: NONREACTIVE
HCT VFR BLD CALC: 46.6 %
HGB BLD-MCNC: 13.8 G/DL
IMM GRANULOCYTES NFR BLD AUTO: 0.6 %
LYMPHOCYTES # BLD AUTO: 2.2 K/UL
LYMPHOCYTES NFR BLD AUTO: 30.4 %
M TB IFN-G BLD-IMP: NEGATIVE
MAN DIFF?: NORMAL
MCHC RBC-ENTMCNC: 20.3 PG
MCHC RBC-ENTMCNC: 29.6 GM/DL
MCV RBC AUTO: 68.4 FL
MONOCYTES # BLD AUTO: 0.58 K/UL
MONOCYTES NFR BLD AUTO: 8 %
NEUTROPHILS # BLD AUTO: 4.27 K/UL
NEUTROPHILS NFR BLD AUTO: 59.1 %
PLATELET # BLD AUTO: 258 K/UL
POTASSIUM SERPL-SCNC: 5.4 MMOL/L
PROT SERPL-MCNC: 7.6 G/DL
QUANTIFERON TB PLUS MITOGEN MINUS NIL: >10 IU/ML
QUANTIFERON TB PLUS NIL: 0.06 IU/ML
QUANTIFERON TB PLUS TB1 MINUS NIL: 0.01 IU/ML
QUANTIFERON TB PLUS TB2 MINUS NIL: 0.01 IU/ML
RBC # BLD: 6.81 M/UL
RBC # FLD: 17.9 %
SODIUM SERPL-SCNC: 145 MMOL/L
VZV AB TITR SER: POSITIVE
VZV IGG SER IF-ACNC: 3077 INDEX
WBC # FLD AUTO: 7.23 K/UL

## 2022-09-20 LAB — CALPROTECTIN FECAL: 229 UG/G

## 2023-03-08 ENCOUNTER — LABORATORY RESULT (OUTPATIENT)
Age: 63
End: 2023-03-08

## 2023-03-08 ENCOUNTER — APPOINTMENT (OUTPATIENT)
Dept: GASTROENTEROLOGY | Facility: CLINIC | Age: 63
End: 2023-03-08
Payer: MEDICAID

## 2023-03-08 VITALS
WEIGHT: 190 LBS | SYSTOLIC BLOOD PRESSURE: 105 MMHG | TEMPERATURE: 97.5 F | HEIGHT: 71 IN | DIASTOLIC BLOOD PRESSURE: 80 MMHG | OXYGEN SATURATION: 98 % | HEART RATE: 70 BPM | BODY MASS INDEX: 26.6 KG/M2

## 2023-03-08 PROCEDURE — 99213 OFFICE O/P EST LOW 20 MIN: CPT | Mod: 25

## 2023-03-08 RX ORDER — MESALAMINE 0.38 G/1
0.38 CAPSULE, EXTENDED RELEASE ORAL
Qty: 360 | Refills: 1 | Status: ACTIVE | COMMUNITY
Start: 2023-03-08 | End: 1900-01-01

## 2023-03-08 NOTE — PHYSICAL EXAM
[Alert] : alert [Normal Voice/Communication] : normal voice/communication [Healthy Appearing] : healthy appearing [No Acute Distress] : no acute distress [Normal Appearance] : the appearance of the neck was normal [Sclera] : the sclera and conjunctiva were normal [No Neck Mass] : no neck mass was observed [No Respiratory Distress] : no respiratory distress [No Acc Muscle Use] : no accessory muscle use [Respiration, Rhythm And Depth] : normal respiratory rhythm and effort [Auscultation Breath Sounds / Voice Sounds] : lungs were clear to auscultation bilaterally [Heart Rate And Rhythm] : heart rate was normal and rhythm regular [Normal S1, S2] : normal S1 and S2 [Murmurs] : no murmurs [None] : no edema [Bowel Sounds] : normal bowel sounds [Abdomen Tenderness] : non-tender [No Masses] : no abdominal mass palpated [Abdomen Soft] : soft [] : no hepatosplenomegaly [Cervical Lymph Nodes Enlarged Posterior Bilaterally] : no posterior cervical lymphadenopathy [Cervical Lymph Nodes Enlarged Anterior Bilaterally] : no anterior cervical lymphadenopathy [Supraclavicular Lymph Nodes Enlarged Bilaterally] : no supraclavicular lymphadenopathy [No CVA Tenderness] : no CVA  tenderness [Abnormal Walk] : normal gait [No Clubbing, Cyanosis] : no clubbing or cyanosis of the fingernails [Normal Color / Pigmentation] : normal skin color and pigmentation [Oriented To Time, Place, And Person] : oriented to person, place, and time [Normal Affect] : the affect was normal [Normal Insight/Judgment] : insight and judgment were intact [Normal Mood] : the mood was normal [de-identified] : Diastasis recti noted.

## 2023-03-08 NOTE — REASON FOR VISIT
[Follow-up] : a follow-up of an existing diagnosis [FreeTextEntry1] : for follow-up of ulcerative colitis.

## 2023-03-08 NOTE — ASSESSMENT
[FreeTextEntry1] : Impression:\par \par #1 ulcerative colitis- \par                -History of 38 years duration. History suggestive of mild ulcerative colitis. Never hospitalized. Never required steroids.  \par                -Episode of acute diarrhea 3/2022 inflammatory mediated (fecal calprotectin 1111)–unclear if flare UC versus acute self-limited diarrhea of infectious etiology in view of brief duration symptoms. \par                - Surveillance colonoscopy on 5/3/2022 was noted for mildly active colitis primarily in region of ascending colon and hepatic flexure.  \par                -Currently asymptomatic and in clinical remission.  \par \par #2 status post RIH repair.

## 2023-03-08 NOTE — REVIEW OF SYSTEMS
[As Noted in HPI] : as noted in HPI [Abdominal Pain] : no abdominal pain [Vomiting] : no vomiting [Constipation] : no constipation [Diarrhea] : no diarrhea [Heartburn] : no heartburn [Melena (black stool)] : no melena [Bleeding] : no bleeding [Fecal Incontinence (soiling)] : no fecal incontinence [Bloating (gassiness)] : no bloating

## 2023-03-08 NOTE — HISTORY OF PRESENT ILLNESS
[FreeTextEntry1] : Office revisit on 3/8/2023.\par \par Patient presents for GI followup regarding history of ulcerative colitis.\par \par PREVIOUS HISTORY:\par \par     -History of ulcerative colitis of 30 years duration. Previously under the care of Dr. Kirk Garcia. Prompted by changes in his insurance patient now seeks followup under my care. Await records.\par     -Patient unclear as to extend of ulcerative colitis. Never hospitalized. Never on prednisone. Recalls no topical therapy. Previously on Asacol 8 mg t.i.d. Currently on Apriso 4 tabs daily.\par     -Describes last flare approximately 18 months ago. Experienced lower abdominal pain, cramps, loose stool and diarrhea but no blood. No change in treatment. Symptoms resolved.\par \par Had colonoscopy in 2012. Apparently some type of abnormal finding prompting consultation by Dr. Hernandez Santiago who did a chromoendoscopy. Patient unclear as to what the issue was-??dysplasia. Patient reports findings at followup colonoscopy were negative and no other intervention was recommended.\par \par Currently asymptomatic. Stable appetite and weight. Has 2-3 formed bowel movements daily without any complaints of diarrhea, constipation, change in bowel habit, melena, hematochezia or rectal bleeding.\par \par Denies oral ulcers, fever, dysphagia, heartburn, nausea, vomiting or abdominal pain.\par \par Denies any history of gallstones or kidney stones.\par \par Denies any history of extraintestinal manifestations.\par \par INTERVAL HISTORY:\par \par Surveillance colonoscopy performed on 4/6/15. Colonic mucosa normal throughout except for focal superficial ulceration in sigmoid colon. Biopsies of the cecum were normal. Ascending colon biopsy revealed chronic colitis with focal activity. Hepatic flexure biopsies revealed chronic inactive colitis as a did biopsies in the transverse colon and sigmoid colon. Sigmoid colon biopsy revealed chronic active colitis with ulceration. Rectal biopsy revealed chronic inactive proctitis. Dysplasia was not detected at any site.\par \par CURRENT HISTORY:\par \par ORV 7/12/17:    -Routine followup regarding history of ulcerative colitis.\par                           -Asymptomatic. He feels fine. Has one to 2 formed bowel movements daily without any complaints of diarrhea, constipation, change in bowel habit or rectal bleeding. Appetite and weight are stable. Denies any complaint of dysphagia, heartburn, nausea, vomiting or abdominal pain.\par                           -Patient is on Apriso 4 times q.d.\par                           -Patient reports normal labs recently obtained by PMD (Dr. DANG Farias [phone]).\par \par COLONOSCOPY 10/26/17:    -Surveillance colonoscopy was performed in 10/26/17. History of ulcerative colitis of 32 years duration. Total colonoscopy was performed to the cecum with ileoscopy. Normal terminal ileum. Normal colonic mucosa throughout except for minimal focal superficial ulceration in the transverse colon. Colonic biopsies from the ascending colon, transverse colon, descending colon, sigmoid colon and rectum were all normal without active colitis except for mildly active colitis focally noted in the transverse colon. Dysplasia was not detected in the site. Followup surveillance colonoscopy is advised in 2 years. \par \par ORV 2/27/19:    -Patient presents for office revisit regarding history of ulcerative colitis. Feels fine. Asymptomatic from a GI standpoint. He has one to 2 formed bowel movements daily without any complaints of diarrhea, constipation, change in bowel habit or rectal bleeding. Appetite and weight are stable. Denies any complaints of dysphagia, heartburn, nausea, vomiting or abdominal pain. Patient remains on maintenance Apriso 4 tabs q.d. Patient indicates that he had a routine followup appointment with his PMD one month ago at which time routine labs were normal.\par \par ORV 8/28/19:      -Patient presents for followup regarding history of ulcerative colitis. He feels fine. Currently asymptomatic. Appetite and weight are stable. Denies any complaints of dysphagia, heartburn, nausea, vomiting or abdominal pain. He has one to 2 formed bowel movements daily and reports no complaints of any diarrhea, constipation, change in bowel habit or rectal bleeding. The patient indicates normal routine labs by his PMD several weeks ago. He is on maintenance Apriso 4 tabs q.d.\par \par COLONOSCOPY 12/2/2019:     -Surveillance colonoscopy was performed on 12/2/19. History of ulcerative colitis. Total colonoscopy was performed to the cecum with ileoscopy. Normal terminal ileum. Mildly active colitis was noted mostly in the region of the cecum, ascending colon and transverse colon with associated superficial ulceration, scant exudate and erythema. Focal sparing and skip areas noted. No friability. Descending colon normal. A few superficial ulcers noted in sigmoid colon with normal interval mucosa. Mild anal stenosis noted. Biopsy of the cecum, ascending colon and transverse colon was noted for chronic active colitis. Descending colon biopsies was normal. Sigmoid biopsy noted for chronic active colitis. Rectum biopsy without active colitis. Dysplasia not detected at any site. As noted, history of ulcerative colitis. Current exam noted for active colitis with question as to Crohn's disease in view of skip areas and focal and segmental changes noted. Surveillance colonoscopy advised in one year.\par \par ORV 3/2/2022:     -Presents for follow-up regarding history of ulcerative colitis.  Feels well at current time.  On maintenance mesalamine 4 tabs daily (0.375 g/tab).  Typically has 2-3 formed [place] 3 bowel movements daily without any current complaints of diarrhea, constipation, change in bowel habit or rectal bleeding.  Indicate he might have a loose stool approximately once per month but self-limited and not protracted.  Denies fever.  Appetite and weight are stable.  Denies complaints of dysphagia, heartburn, nausea, vomiting or abdominal pain.\par                             -At last assessment of 10/4/2021 CBCs/platelets were normal including hemoglobin 14.1 and hematocrit 47.  CMP was normal including normal liver enzymes with ALT 37.  CRP <3 and ESR was 37.\par \par COLONOSCOPY 5/3/2022:     -Surveillance colonoscopy was performed on 5/3/2022. History of ulcerative colitis diagnosed at approximately age 36. Total colonoscopy was performed to the cecum with ileoscopy. Normal terminal ileum. A mild segmental colitis was noted in the region of the mid and distal ascending colon and hepatic flexure with the appearance of mild erythema, punctate pinpoint ulcerations and blunted but not absent vascular markings. Superficial ulceration was focally noted at the hepatic flexure. The mucosa appeared normal in the cecum, transverse colon, descending colon, sigmoid colon and rectum. Biopsy of cecum revealed focal active colitis. Ascending colon and hepatic flexure biopsies revealed chronic active colitis. Transverse colon, descending colon, sigmoid colon and rectum biopsies either revealed normal findings or only chronic inactive colitis. Dysplasia was not detected at any site. Follow-up surveillance colonoscopy is advised in 2 years.\par                                                -Findings discussed with patient. He indicates feeling fine. He has 2 formed bowel movements daily without any complaints of diarrhea or rectal bleeding. He is currently on a regimen of mesalamine 4 tabs daily (0.375 g/tab). Current findings and presence of some mild focal active colitis discussed. Option of escalating to a biologic treatment regimen with consideration of Stelara, Entyvio or Zeposia was discussed. At present, patient does not wish to change his treatment. His patient preference is to maintain the status quo. I advised repeating a fecal calprotectin 7/2022 and recommended the patient make an office revisit appointment 9/2022. \par \par ORV 9/7/2022:     -Evaluated for follow-up regarding ulcerative colitis.  Currently feels fine.  Has 1-2 formed bowel movements daily and describes passage of formed Abbot 3 or Abbot 4 bowel movements without any current complaints of diarrhea or rectal bleeding.  Reports no fever.  Appetite and weight are stable.  Reports no complaints of dysphagia, nausea, vomiting or abdominal pain.  Currently on maintenance regimen of mesalamine 4 tabs daily (0.375 g/tab).\par                             -Had been evaluated for self-limited diarrhea 3/2022 at which time he was reporting 3-4 diarrheal stools daily in association with nausea.  Evaluation at that time noted for CRP 17 and fecal calprotectin 1111.  Stool studies for C. difficile, culture and GI PCR were negative.  Of note, these acute diarrheal symptoms subsided at that time.\par                             -As noted, colonoscopy 5/3/2022 noted for mild segmental colitis region mid and distal ascending colon and hepatic flexure with biopsy at those sites revealing chronic active colitis.  Mucosa at other sites healed without active colitis detected.  Dysplasia not detected at any site.\par \par ORV 3/8/2023:    -Evaluated for follow-up regarding ulcerative colitis.  Doing well at current time.  Asymptomatic from GI standpoint.  Has 2 formed [place] 2 bowel movements daily without any current complaints of diarrhea or rectal bleeding.  Reports no fever, nausea, vomiting or abdominal pain.  Currently on regimen of mesalamine 4 tabs daily (0.375 g/tab).

## 2023-03-09 LAB
ALBUMIN SERPL ELPH-MCNC: 4.6 G/DL
ALP BLD-CCNC: 58 U/L
ALT SERPL-CCNC: 38 U/L
ANION GAP SERPL CALC-SCNC: 12 MMOL/L
AST SERPL-CCNC: 23 U/L
BILIRUB SERPL-MCNC: 0.4 MG/DL
BUN SERPL-MCNC: 18 MG/DL
CALCIUM SERPL-MCNC: 9.5 MG/DL
CHLORIDE SERPL-SCNC: 102 MMOL/L
CO2 SERPL-SCNC: 27 MMOL/L
CREAT SERPL-MCNC: 1.07 MG/DL
CRP SERPL-MCNC: 6 MG/L
EGFR: 79 ML/MIN/1.73M2
ERYTHROCYTE [SEDIMENTATION RATE] IN BLOOD BY WESTERGREN METHOD: 65 MM/HR
GLUCOSE SERPL-MCNC: 102 MG/DL
POTASSIUM SERPL-SCNC: 4.5 MMOL/L
PROT SERPL-MCNC: 7.5 G/DL
SODIUM SERPL-SCNC: 141 MMOL/L

## 2023-03-10 LAB
BASOPHILS # BLD AUTO: 0.06 K/UL
BASOPHILS NFR BLD AUTO: 0.9 %
EOSINOPHIL # BLD AUTO: 0.26 K/UL
EOSINOPHIL NFR BLD AUTO: 3.7 %
HCT VFR BLD CALC: 45.8 %
HGB BLD-MCNC: 13.9 G/DL
LYMPHOCYTES # BLD AUTO: 2.75 K/UL
LYMPHOCYTES NFR BLD AUTO: 38.5 %
MAN DIFF?: NORMAL
MCHC RBC-ENTMCNC: 20.9 PG
MCHC RBC-ENTMCNC: 30.3 GM/DL
MCV RBC AUTO: 68.9 FL
MONOCYTES # BLD AUTO: 0.59 K/UL
MONOCYTES NFR BLD AUTO: 8.3 %
NEUTROPHILS # BLD AUTO: 3.47 K/UL
NEUTROPHILS NFR BLD AUTO: 48.6 %
PLATELET # BLD AUTO: 269 K/UL
RBC # BLD: 6.65 M/UL
RBC # FLD: 17.6 %
WBC # FLD AUTO: 7.14 K/UL

## 2023-04-11 RX ORDER — MESALAMINE 0.38 G/1
0.38 CAPSULE, EXTENDED RELEASE ORAL DAILY
Qty: 120 | Refills: 3 | Status: ACTIVE | COMMUNITY
Start: 2023-04-11 | End: 1900-01-01

## 2023-04-15 LAB — CALPROTECTIN FECAL: 393 UG/G

## 2023-04-28 RX ORDER — MESALAMINE 0.38 G/1
0.38 CAPSULE, EXTENDED RELEASE ORAL
Qty: 360 | Refills: 3 | Status: ACTIVE | COMMUNITY
Start: 2023-04-28 | End: 1900-01-01

## 2023-08-02 RX ORDER — MESALAMINE 375 MG/1
0.38 CAPSULE, EXTENDED RELEASE ORAL
Qty: 360 | Refills: 3 | Status: ACTIVE | COMMUNITY
Start: 2019-08-28 | End: 1900-01-01

## 2023-09-20 ENCOUNTER — APPOINTMENT (OUTPATIENT)
Dept: GASTROENTEROLOGY | Facility: CLINIC | Age: 63
End: 2023-09-20
Payer: MEDICAID

## 2023-09-20 VITALS
HEIGHT: 71 IN | OXYGEN SATURATION: 97 % | DIASTOLIC BLOOD PRESSURE: 78 MMHG | WEIGHT: 185 LBS | HEART RATE: 76 BPM | BODY MASS INDEX: 25.9 KG/M2 | SYSTOLIC BLOOD PRESSURE: 121 MMHG

## 2023-09-20 PROCEDURE — 99213 OFFICE O/P EST LOW 20 MIN: CPT | Mod: 25

## 2023-09-20 PROCEDURE — 36415 COLL VENOUS BLD VENIPUNCTURE: CPT

## 2023-09-22 LAB
ALBUMIN SERPL ELPH-MCNC: 4.8 G/DL
ALP BLD-CCNC: 52 U/L
ALT SERPL-CCNC: 44 U/L
AST SERPL-CCNC: 25 U/L
BILIRUB DIRECT SERPL-MCNC: 0.2 MG/DL
BILIRUB INDIRECT SERPL-MCNC: 0.4 MG/DL
BILIRUB SERPL-MCNC: 0.6 MG/DL
CREAT SERPL-MCNC: 0.95 MG/DL
CRP SERPL-MCNC: 5 MG/L
EGFR: 90 ML/MIN/1.73M2
HCT VFR BLD CALC: 44.8 %
HGB BLD-MCNC: 13.5 G/DL
MCHC RBC-ENTMCNC: 20.2 PG
MCHC RBC-ENTMCNC: 30.1 GM/DL
MCV RBC AUTO: 67.1 FL
PLATELET # BLD AUTO: 273 K/UL
PROT SERPL-MCNC: 7.8 G/DL
RBC # BLD: 6.68 M/UL
RBC # FLD: 18.1 %
WBC # FLD AUTO: 8.05 K/UL

## 2023-09-26 LAB — CALPROTECTIN FECAL: 96 UG/G

## 2024-03-20 ENCOUNTER — APPOINTMENT (OUTPATIENT)
Dept: GASTROENTEROLOGY | Facility: CLINIC | Age: 64
End: 2024-03-20
Payer: MEDICAID

## 2024-03-20 VITALS
OXYGEN SATURATION: 97 % | HEART RATE: 80 BPM | SYSTOLIC BLOOD PRESSURE: 129 MMHG | HEIGHT: 71 IN | DIASTOLIC BLOOD PRESSURE: 79 MMHG | WEIGHT: 186 LBS | BODY MASS INDEX: 26.04 KG/M2

## 2024-03-20 DIAGNOSIS — K51.90 ULCERATIVE COLITIS, UNSPECIFIED, W/OUT COMPLICATIONS: ICD-10-CM

## 2024-03-20 PROCEDURE — 99213 OFFICE O/P EST LOW 20 MIN: CPT

## 2024-03-20 RX ORDER — MESALAMINE 0.38 G/1
0.38 CAPSULE, EXTENDED RELEASE ORAL
Qty: 360 | Refills: 3 | Status: ACTIVE | COMMUNITY
Start: 2024-03-20 | End: 1900-01-01

## 2024-03-20 NOTE — CONSULT LETTER
[Consult Letter:] : I had the pleasure of evaluating your patient, [unfilled]. [Dear  ___] : Dear  [unfilled], [Please see my note below.] : Please see my note below. [Consult Closing:] : Thank you very much for allowing me to participate in the care of this patient.  If you have any questions, please do not hesitate to contact me. [Sincerely,] : Sincerely, [FreeTextEntry2] : Dr. DANG Farias [FreeTextEntry3] : Prem Gregory M.D.

## 2024-03-20 NOTE — ASSESSMENT
[FreeTextEntry1] : Impression:  #1 ulcerative colitis-                 -History of 38 years duration. History of mild ulcerative colitis. Never hospitalized. Never required steroids.                  -Episode of acute diarrhea 3/2022 inflammatory mediated (fecal calprotectin 1111)-unclear if flare UC versus acute self-limited diarrhea of infectious etiology in view of brief duration symptoms.                 - Surveillance colonoscopy on 5/3/2022 was noted for mildly active colitis primarily in region of ascending colon and hepatic flexure.                  -CURRENT (3/20/2024): Doing well and currently asymptomatic and in clinical remission.  Favorable calprotectin at last assessment of 96.  #2 status post RIH repair.

## 2024-03-20 NOTE — ADDENDUM
[FreeTextEntry1] : Plan:  #1 clinical status regarding ulcerative colitis reviewed with patient.  Fortunately, as noted, he reports doing well and is currently asymptomatic from a GI standpoint.  #2 patient indicates he has an upcoming appointment with PMD next month and will have labs submitted.  Requested to have these reports submitted to me for review.  Therefore, follow-up laboratory testing not submitted at this office visit appointment.  #3 fecal calprotectin will be periodically monitored.  #4 avoid NSAIDs.  #5 continue current regimen of mesalamine 4 tabs daily (0.375 g/tab).  #6 surveillance colonoscopy advised, and patient indicates his preference is to pursue this fall 2024.  Increased risk of colon cancer associated with chronic ulcerative colitis has been discussed.  #7 office revisit in 6 months.

## 2024-03-20 NOTE — PHYSICAL EXAM
[Alert] : alert [Healthy Appearing] : healthy appearing [Normal Voice/Communication] : normal voice/communication [Normal Appearance] : the appearance of the neck was normal [Sclera] : the sclera and conjunctiva were normal [No Acute Distress] : no acute distress [No Respiratory Distress] : no respiratory distress [No Acc Muscle Use] : no accessory muscle use [Respiration, Rhythm And Depth] : normal respiratory rhythm and effort [Auscultation Breath Sounds / Voice Sounds] : lungs were clear to auscultation bilaterally [Normal S1, S2] : normal S1 and S2 [Heart Rate And Rhythm] : heart rate was normal and rhythm regular [Murmurs] : no murmurs [None] : no edema [Abdomen Tenderness] : non-tender [No Masses] : no abdominal mass palpated [Abdomen Soft] : soft [] : no hepatosplenomegaly [Abnormal Walk] : normal gait [No Clubbing, Cyanosis] : no clubbing or cyanosis of the fingernails [Normal Color / Pigmentation] : normal skin color and pigmentation [Oriented To Time, Place, And Person] : oriented to person, place, and time [Normal Affect] : the affect was normal [Normal Insight/Judgment] : insight and judgment were intact [de-identified] : Diastasis recti noted. [Normal Mood] : the mood was normal

## 2024-03-20 NOTE — HISTORY OF PRESENT ILLNESS
[FreeTextEntry1] : Office revisit on 3/20/2024.  Patient presents for GI followup regarding history of ulcerative colitis.  PREVIOUS HISTORY:      -History of ulcerative colitis of 30 years duration. Previously under the care of Dr. Kirk Garcia. Prompted by changes in his insurance patient now seeks followup under my care. Await records.     -Patient unclear as to extend of ulcerative colitis. Never hospitalized. Never on prednisone. Recalls no topical therapy. Previously on Asacol 8 mg t.i.d. Currently on Apriso 4 tabs daily.     -Describes last flare approximately 18 months ago. Experienced lower abdominal pain, cramps, loose stool and diarrhea but no blood. No change in treatment. Symptoms resolved.  Had colonoscopy in 2012. Apparently some type of abnormal finding prompting consultation by Dr. Hernandez Santiago who did a chromoendoscopy. Patient unclear as to what the issue was-??dysplasia. Patient reports findings at followup colonoscopy were negative and no other intervention was recommended.  Currently asymptomatic. Stable appetite and weight. Has 2-3 formed bowel movements daily without any complaints of diarrhea, constipation, change in bowel habit, melena, hematochezia or rectal bleeding.  Denies oral ulcers, fever, dysphagia, heartburn, nausea, vomiting or abdominal pain.  Denies any history of gallstones or kidney stones.  Denies any history of extraintestinal manifestations.  INTERVAL HISTORY:  Surveillance colonoscopy performed on 4/6/15. Colonic mucosa normal throughout except for focal superficial ulceration in sigmoid colon. Biopsies of the cecum were normal. Ascending colon biopsy revealed chronic colitis with focal activity. Hepatic flexure biopsies revealed chronic inactive colitis as a did biopsies in the transverse colon and sigmoid colon. Sigmoid colon biopsy revealed chronic active colitis with ulceration. Rectal biopsy revealed chronic inactive proctitis. Dysplasia was not detected at any site.  CURRENT HISTORY:  ORV 7/12/17:    -Routine followup regarding history of ulcerative colitis.                           -Asymptomatic. He feels fine. Has one to 2 formed bowel movements daily without any complaints of diarrhea, constipation, change in bowel habit or rectal bleeding. Appetite and weight are stable. Denies any complaint of dysphagia, heartburn, nausea, vomiting or abdominal pain.                           -Patient is on Apriso 4 times q.d.                           -Patient reports normal labs recently obtained by PMD (Dr. DANG Farias [phone]).  COLONOSCOPY 10/26/17:    -Surveillance colonoscopy was performed in 10/26/17. History of ulcerative colitis of 32 years duration. Total colonoscopy was performed to the cecum with ileoscopy. Normal terminal ileum. Normal colonic mucosa throughout except for minimal focal superficial ulceration in the transverse colon. Colonic biopsies from the ascending colon, transverse colon, descending colon, sigmoid colon and rectum were all normal without active colitis except for mildly active colitis focally noted in the transverse colon. Dysplasia was not detected in the site. Followup surveillance colonoscopy is advised in 2 years.   ORV 2/27/19:    -Patient presents for office revisit regarding history of ulcerative colitis. Feels fine. Asymptomatic from a GI standpoint. He has one to 2 formed bowel movements daily without any complaints of diarrhea, constipation, change in bowel habit or rectal bleeding. Appetite and weight are stable. Denies any complaints of dysphagia, heartburn, nausea, vomiting or abdominal pain. Patient remains on maintenance Apriso 4 tabs q.d. Patient indicates that he had a routine followup appointment with his PMD one month ago at which time routine labs were normal.  ORV 8/28/19:      -Patient presents for followup regarding history of ulcerative colitis. He feels fine. Currently asymptomatic. Appetite and weight are stable. Denies any complaints of dysphagia, heartburn, nausea, vomiting or abdominal pain. He has one to 2 formed bowel movements daily and reports no complaints of any diarrhea, constipation, change in bowel habit or rectal bleeding. The patient indicates normal routine labs by his PMD several weeks ago. He is on maintenance Apriso 4 tabs q.d.  COLONOSCOPY 12/2/2019:     -Surveillance colonoscopy was performed on 12/2/19. History of ulcerative colitis. Total colonoscopy was performed to the cecum with ileoscopy. Normal terminal ileum. Mildly active colitis was noted mostly in the region of the cecum, ascending colon and transverse colon with associated superficial ulceration, scant exudate and erythema. Focal sparing and skip areas noted. No friability. Descending colon normal. A few superficial ulcers noted in sigmoid colon with normal interval mucosa. Mild anal stenosis noted. Biopsy of the cecum, ascending colon and transverse colon was noted for chronic active colitis. Descending colon biopsies was normal. Sigmoid biopsy noted for chronic active colitis. Rectum biopsy without active colitis. Dysplasia not detected at any site. As noted, history of ulcerative colitis. Current exam noted for active colitis with question as to Crohn's disease in view of skip areas and focal and segmental changes noted. Surveillance colonoscopy advised in one year.  ORV 3/2/2022:     -Presents for follow-up regarding history of ulcerative colitis.  Feels well at current time.  On maintenance mesalamine 4 tabs daily (0.375 g/tab).  Typically has 2-3 formed [place] 3 bowel movements daily without any current complaints of diarrhea, constipation, change in bowel habit or rectal bleeding.  Indicate he might have a loose stool approximately once per month but self-limited and not protracted.  Denies fever.  Appetite and weight are stable.  Denies complaints of dysphagia, heartburn, nausea, vomiting or abdominal pain.                             -At last assessment of 10/4/2021 CBCs/platelets were normal including hemoglobin 14.1 and hematocrit 47.  CMP was normal including normal liver enzymes with ALT 37.  CRP <3 and ESR was 37.  COLONOSCOPY 5/3/2022:     -Surveillance colonoscopy was performed on 5/3/2022. History of ulcerative colitis diagnosed at approximately age 36. Total colonoscopy was performed to the cecum with ileoscopy. Normal terminal ileum. A mild segmental colitis was noted in the region of the mid and distal ascending colon and hepatic flexure with the appearance of mild erythema, punctate pinpoint ulcerations and blunted but not absent vascular markings. Superficial ulceration was focally noted at the hepatic flexure. The mucosa appeared normal in the cecum, transverse colon, descending colon, sigmoid colon and rectum. Biopsy of cecum revealed focal active colitis. Ascending colon and hepatic flexure biopsies revealed chronic active colitis. Transverse colon, descending colon, sigmoid colon and rectum biopsies either revealed normal findings or only chronic inactive colitis. Dysplasia was not detected at any site. Follow-up surveillance colonoscopy is advised in 2 years.                                                -Findings discussed with patient. He indicates feeling fine. He has 2 formed bowel movements daily without any complaints of diarrhea or rectal bleeding. He is currently on a regimen of mesalamine 4 tabs daily (0.375 g/tab). Current findings and presence of some mild focal active colitis discussed. Option of escalating to a biologic treatment regimen with consideration of Stelara, Entyvio or Zeposia was discussed. At present, patient does not wish to change his treatment. His patient preference is to maintain the status quo. I advised repeating a fecal calprotectin 7/2022 and recommended the patient make an office revisit appointment 9/2022.   ORV 9/7/2022:     -Evaluated for follow-up regarding ulcerative colitis.  Currently feels fine.  Has 1-2 formed bowel movements daily and describes passage of formed [place] 3 or [place] 4 bowel movements without any current complaints of diarrhea or rectal bleeding.  Reports no fever.  Appetite and weight are stable.  Reports no complaints of dysphagia, nausea, vomiting or abdominal pain.  Currently on maintenance regimen of mesalamine 4 tabs daily (0.375 g/tab).                             -Had been evaluated for self-limited diarrhea 3/2022 at which time he was reporting 3-4 diarrheal stools daily in association with nausea.  Evaluation at that time noted for CRP 17 and fecal calprotectin 1111.  Stool studies for C. difficile, culture and GI PCR were negative.  Of note, these acute diarrheal symptoms subsided at that time.                             -As noted, colonoscopy 5/3/2022 noted for mild segmental colitis region mid and distal ascending colon and hepatic flexure with biopsy at those sites revealing chronic active colitis.  Mucosa at other sites healed without active colitis detected.  Dysplasia not detected at any site.  ORV 3/8/2023:    -Evaluated for follow-up regarding ulcerative colitis.  Doing well at current time.  Asymptomatic from GI standpoint.  Has 2 formed [place] 2 bowel movements daily without any current complaints of diarrhea or rectal bleeding.  Reports no fever, nausea, vomiting or abdominal pain.  Currently on regimen of mesalamine 4 tabs daily (0.375 g/tab).  ORV 9/20/2023:     -Presents for follow-up regarding ulcerative colitis.  Currently on maintenance mesalamine 4 tabs daily (0.375 g/tab).  Indicates doing well.  No complaints.  Typically has 2 formed [place] 4 or [place] 5 bowel movements without any complaints of diarrhea or rectal bleeding.  Reports no fever, nausea, vomiting or abdominal pain.  ORV 3/20/2024:     -Evaluated for follow-up regarding ulcerative colitis.  Patient reports feeling well.  No complaints.  Currently asymptomatic from a GI standpoint.  He has 1-2 formed bowel movements daily without any current complaints of diarrhea, constipation or rectal bleeding.  Reports no fever.  Appetite and weight are stable.  Reports no complaints of dysphagia, heartburn, nausea, vomiting or abdominal pain.  At last assessment on 9/20/2023 CBC/platelets normal and hepatic function profile normal with ALT at upper normal level of 44.  Fecal calprotectin favorable at 96.

## 2024-09-05 ENCOUNTER — NON-APPOINTMENT (OUTPATIENT)
Age: 64
End: 2024-09-05

## 2024-09-05 ENCOUNTER — RX RENEWAL (OUTPATIENT)
Age: 64
End: 2024-09-05

## 2024-10-02 ENCOUNTER — APPOINTMENT (OUTPATIENT)
Dept: GASTROENTEROLOGY | Facility: CLINIC | Age: 64
End: 2024-10-02
Payer: MEDICAID

## 2024-10-02 VITALS
BODY MASS INDEX: 24.92 KG/M2 | DIASTOLIC BLOOD PRESSURE: 78 MMHG | HEIGHT: 71 IN | OXYGEN SATURATION: 98 % | WEIGHT: 178 LBS | HEART RATE: 86 BPM | SYSTOLIC BLOOD PRESSURE: 121 MMHG

## 2024-10-02 DIAGNOSIS — Z12.11 ENCOUNTER FOR SCREENING FOR MALIGNANT NEOPLASM OF COLON: ICD-10-CM

## 2024-10-02 DIAGNOSIS — K51.90 ULCERATIVE COLITIS, UNSPECIFIED, W/OUT COMPLICATIONS: ICD-10-CM

## 2024-10-02 PROCEDURE — 36415 COLL VENOUS BLD VENIPUNCTURE: CPT

## 2024-10-02 PROCEDURE — 99213 OFFICE O/P EST LOW 20 MIN: CPT

## 2024-10-02 RX ORDER — SODIUM SULFATE, POTASSIUM SULFATE AND MAGNESIUM SULFATE 1.6; 3.13; 17.5 G/177ML; G/177ML; G/177ML
17.5-3.13-1.6 SOLUTION ORAL
Qty: 1 | Refills: 0 | Status: ACTIVE | COMMUNITY
Start: 2024-10-02 | End: 1900-01-01

## 2024-10-02 NOTE — ADDENDUM
[FreeTextEntry1] : Plan:  #1 current clinical status regarding ulcerative colitis reviewed with patient. He reports doing well and is currently asymptomatic from a GI standpoint.  #2 CBC, creatinine, hepatic function profile and CRP were submitted.  Phlebotomy performed at office visit.  #3 fecal calprotectin will be submitted.  #4 avoid NSAIDs.  #5 continue current regimen of mesalamine 4 tabs daily (0.375 g/tab).  #6 surveillance colonoscopy will be scheduled. Risks, benefits and alternatives have been discussed possibly including bleeding, perforation, need for blood transfusion or surgery, infection, missed lesions and medication and anesthetic risks.  The Suprep colonoscopy preparation was prescribed.  #7 office revisit in 6 months.

## 2024-10-02 NOTE — HISTORY OF PRESENT ILLNESS
[FreeTextEntry1] : Office revisit on 10/2/2024.  Patient presents for GI followup regarding history of ulcerative colitis.  PREVIOUS HISTORY:      -History of ulcerative colitis of 30 years duration. Previously under the care of Dr. Kirk Garcia. Prompted by changes in his insurance patient now seeks followup under my care. Await records.     -Patient unclear as to extend of ulcerative colitis. Never hospitalized. Never on prednisone. Recalls no topical therapy. Previously on Asacol 8 mg t.i.d. Currently on Apriso 4 tabs daily.     -Describes last flare approximately 18 months ago. Experienced lower abdominal pain, cramps, loose stool and diarrhea but no blood. No change in treatment. Symptoms resolved.  Had colonoscopy in 2012. Apparently some type of abnormal finding prompting consultation by Dr. Hernandez Santiago who did a chromoendoscopy. Patient unclear as to what the issue was-??dysplasia. Patient reports findings at followup colonoscopy were negative and no other intervention was recommended.  Currently asymptomatic. Stable appetite and weight. Has 2-3 formed bowel movements daily without any complaints of diarrhea, constipation, change in bowel habit, melena, hematochezia or rectal bleeding.  Denies oral ulcers, fever, dysphagia, heartburn, nausea, vomiting or abdominal pain.  Denies any history of gallstones or kidney stones.  Denies any history of extraintestinal manifestations.  INTERVAL HISTORY:  Surveillance colonoscopy performed on 4/6/15. Colonic mucosa normal throughout except for focal superficial ulceration in sigmoid colon. Biopsies of the cecum were normal. Ascending colon biopsy revealed chronic colitis with focal activity. Hepatic flexure biopsies revealed chronic inactive colitis as a did biopsies in the transverse colon and sigmoid colon. Sigmoid colon biopsy revealed chronic active colitis with ulceration. Rectal biopsy revealed chronic inactive proctitis. Dysplasia was not detected at any site.  CURRENT HISTORY:  ORV 7/12/17:    -Routine followup regarding history of ulcerative colitis.                           -Asymptomatic. He feels fine. Has one to 2 formed bowel movements daily without any complaints of diarrhea, constipation, change in bowel habit or rectal bleeding. Appetite and weight are stable. Denies any complaint of dysphagia, heartburn, nausea, vomiting or abdominal pain.                           -Patient is on Apriso 4 times q.d.                           -Patient reports normal labs recently obtained by PMD (Dr. DANG Farias [phone]).  COLONOSCOPY 10/26/17:    -Surveillance colonoscopy was performed in 10/26/17. History of ulcerative colitis of 32 years duration. Total colonoscopy was performed to the cecum with ileoscopy. Normal terminal ileum. Normal colonic mucosa throughout except for minimal focal superficial ulceration in the transverse colon. Colonic biopsies from the ascending colon, transverse colon, descending colon, sigmoid colon and rectum were all normal without active colitis except for mildly active colitis focally noted in the transverse colon. Dysplasia was not detected in the site. Followup surveillance colonoscopy is advised in 2 years.   ORV 2/27/19:    -Patient presents for office revisit regarding history of ulcerative colitis. Feels fine. Asymptomatic from a GI standpoint. He has one to 2 formed bowel movements daily without any complaints of diarrhea, constipation, change in bowel habit or rectal bleeding. Appetite and weight are stable. Denies any complaints of dysphagia, heartburn, nausea, vomiting or abdominal pain. Patient remains on maintenance Apriso 4 tabs q.d. Patient indicates that he had a routine followup appointment with his PMD one month ago at which time routine labs were normal.  ORV 8/28/19:      -Patient presents for followup regarding history of ulcerative colitis. He feels fine. Currently asymptomatic. Appetite and weight are stable. Denies any complaints of dysphagia, heartburn, nausea, vomiting or abdominal pain. He has one to 2 formed bowel movements daily and reports no complaints of any diarrhea, constipation, change in bowel habit or rectal bleeding. The patient indicates normal routine labs by his PMD several weeks ago. He is on maintenance Apriso 4 tabs q.d.  COLONOSCOPY 12/2/2019:     -Surveillance colonoscopy was performed on 12/2/19. History of ulcerative colitis. Total colonoscopy was performed to the cecum with ileoscopy. Normal terminal ileum. Mildly active colitis was noted mostly in the region of the cecum, ascending colon and transverse colon with associated superficial ulceration, scant exudate and erythema. Focal sparing and skip areas noted. No friability. Descending colon normal. A few superficial ulcers noted in sigmoid colon with normal interval mucosa. Mild anal stenosis noted. Biopsy of the cecum, ascending colon and transverse colon was noted for chronic active colitis. Descending colon biopsies was normal. Sigmoid biopsy noted for chronic active colitis. Rectum biopsy without active colitis. Dysplasia not detected at any site. As noted, history of ulcerative colitis. Current exam noted for active colitis with question as to Crohn's disease in view of skip areas and focal and segmental changes noted. Surveillance colonoscopy advised in one year.  ORV 3/2/2022:     -Presents for follow-up regarding history of ulcerative colitis.  Feels well at current time.  On maintenance mesalamine 4 tabs daily (0.375 g/tab).  Typically has 2-3 formed Reader 3 bowel movements daily without any current complaints of diarrhea, constipation, change in bowel habit or rectal bleeding.  Indicate he might have a loose stool approximately once per month but self-limited and not protracted.  Denies fever.  Appetite and weight are stable.  Denies complaints of dysphagia, heartburn, nausea, vomiting or abdominal pain.                             -At last assessment of 10/4/2021 CBCs/platelets were normal including hemoglobin 14.1 and hematocrit 47.  CMP was normal including normal liver enzymes with ALT 37.  CRP <3 and ESR was 37.  COLONOSCOPY 5/3/2022:     -Surveillance colonoscopy was performed on 5/3/2022. History of ulcerative colitis diagnosed at approximately age 36. Total colonoscopy was performed to the cecum with ileoscopy. Normal terminal ileum. A mild segmental colitis was noted in the region of the mid and distal ascending colon and hepatic flexure with the appearance of mild erythema, punctate pinpoint ulcerations and blunted but not absent vascular markings. Superficial ulceration was focally noted at the hepatic flexure. The mucosa appeared normal in the cecum, transverse colon, descending colon, sigmoid colon and rectum. Biopsy of cecum revealed focal active colitis. Ascending colon and hepatic flexure biopsies revealed chronic active colitis. Transverse colon, descending colon, sigmoid colon and rectum biopsies either revealed normal findings or only chronic inactive colitis. Dysplasia was not detected at any site. Follow-up surveillance colonoscopy is advised in 2 years.                                                -Findings discussed with patient. He indicates feeling fine. He has 2 formed bowel movements daily without any complaints of diarrhea or rectal bleeding. He is currently on a regimen of mesalamine 4 tabs daily (0.375 g/tab). Current findings and presence of some mild focal active colitis discussed. Option of escalating to a biologic treatment regimen with consideration of Stelara, Entyvio or Zeposia was discussed. At present, patient does not wish to change his treatment. His patient preference is to maintain the status quo. I advised repeating a fecal calprotectin 7/2022 and recommended the patient make an office revisit appointment 9/2022.   ORV 9/7/2022:     -Evaluated for follow-up regarding ulcerative colitis.  Currently feels fine.  Has 1-2 formed bowel movements daily and describes passage of formed Reader 3 or Reader 4 bowel movements without any current complaints of diarrhea or rectal bleeding.  Reports no fever.  Appetite and weight are stable.  Reports no complaints of dysphagia, nausea, vomiting or abdominal pain.  Currently on maintenance regimen of mesalamine 4 tabs daily (0.375 g/tab).                             -Had been evaluated for self-limited diarrhea 3/2022 at which time he was reporting 3-4 diarrheal stools daily in association with nausea.  Evaluation at that time noted for CRP 17 and fecal calprotectin 1111.  Stool studies for C. difficile, culture and GI PCR were negative.  Of note, these acute diarrheal symptoms subsided at that time.                             -As noted, colonoscopy 5/3/2022 noted for mild segmental colitis region mid and distal ascending colon and hepatic flexure with biopsy at those sites revealing chronic active colitis.  Mucosa at other sites healed without active colitis detected.  Dysplasia not detected at any site.  ORV 3/8/2023:    -Evaluated for follow-up regarding ulcerative colitis.  Doing well at current time.  Asymptomatic from GI standpoint.  Has 2 formed Reader 2 bowel movements daily without any current complaints of diarrhea or rectal bleeding.  Reports no fever, nausea, vomiting or abdominal pain.  Currently on regimen of mesalamine 4 tabs daily (0.375 g/tab).  ORV 9/20/2023:     -Presents for follow-up regarding ulcerative colitis.  Currently on maintenance mesalamine 4 tabs daily (0.375 g/tab).  Indicates doing well.  No complaints.  Typically has 2 formed Reader 4 or Reader 5 bowel movements without any complaints of diarrhea or rectal bleeding.  Reports no fever, nausea, vomiting or abdominal pain.  ORV 3/20/2024:     -Evaluated for follow-up regarding ulcerative colitis.  Patient reports feeling well.  No complaints.  Currently asymptomatic from a GI standpoint.  He has 1-2 formed bowel movements daily without any current complaints of diarrhea, constipation or rectal bleeding.  Reports no fever.  Appetite and weight are stable.  Reports no complaints of dysphagia, heartburn, nausea, vomiting or abdominal pain.  At last assessment on 9/20/2023 CBC/platelets normal and hepatic function profile normal with ALT at upper normal level of 44.  Fecal calprotectin favorable at 96.  ORV 10/2/2024:     -Presents for follow-up regarding ulcerative colitis.  Reports doing well at current time.  No complaints and currently asymptomatic from a GI standpoint.  Has 1-2 formed bowel movements daily without any complaints of diarrhea, constipation report of rectal bleeding.  Appetite and weight are stable and reports no complaints of dysphagia, heartburn, nausea, vomiting or abdominal pain.  Current medication regimen: Mesalamine 4 tabs daily (0.375 g/tab).  Indicates no change to medical history since last evaluation.

## 2024-10-02 NOTE — PHYSICAL EXAM
[Alert] : alert [Normal Voice/Communication] : normal voice/communication [Healthy Appearing] : healthy appearing [No Acute Distress] : no acute distress [Sclera] : the sclera and conjunctiva were normal [Normal Appearance] : the appearance of the neck was normal [No Respiratory Distress] : no respiratory distress [No Acc Muscle Use] : no accessory muscle use [Respiration, Rhythm And Depth] : normal respiratory rhythm and effort [Auscultation Breath Sounds / Voice Sounds] : lungs were clear to auscultation bilaterally [Heart Rate And Rhythm] : heart rate was normal and rhythm regular [Normal S1, S2] : normal S1 and S2 [Murmurs] : no murmurs [None] : no edema [Abdomen Tenderness] : non-tender [No Masses] : no abdominal mass palpated [Abdomen Soft] : soft [] : no hepatosplenomegaly [Abnormal Walk] : normal gait [No Clubbing, Cyanosis] : no clubbing or cyanosis of the fingernails [Normal Color / Pigmentation] : normal skin color and pigmentation [Oriented To Time, Place, And Person] : oriented to person, place, and time [Normal Affect] : the affect was normal [Normal Insight/Judgment] : insight and judgment were intact [Normal Mood] : the mood was normal [de-identified] : Diastasis recti noted.

## 2024-10-02 NOTE — ASSESSMENT
[FreeTextEntry1] : Impression:  #1 ulcerative colitis-                 -History of 38 years duration. History of mild ulcerative colitis. Never hospitalized. Never required steroids.                  -Episode of acute diarrhea 3/2022 inflammatory mediated (fecal calprotectin 1111)-unclear if flare UC versus acute self-limited diarrhea of infectious etiology in view of brief duration symptoms.                 - Surveillance colonoscopy on 5/3/2022 was noted for mildly active colitis primarily in region of ascending colon and hepatic flexure.                  -CURRENT (10/2/2024): Stable and currently asymptomatic and in clinical remission.  Favorable calprotectin at last assessment of 96.  #2 status post RIH repair.

## 2024-10-03 LAB
ALBUMIN SERPL ELPH-MCNC: 4.6 G/DL
ALP BLD-CCNC: 63 U/L
ALT SERPL-CCNC: 43 U/L
AST SERPL-CCNC: 24 U/L
BILIRUB DIRECT SERPL-MCNC: 0.1 MG/DL
BILIRUB INDIRECT SERPL-MCNC: 0.3 MG/DL
BILIRUB SERPL-MCNC: 0.5 MG/DL
CRP SERPL-MCNC: 4 MG/L
HCT VFR BLD CALC: 47.3 %
HGB BLD-MCNC: 14.1 G/DL
MCHC RBC-ENTMCNC: 20.3 PG
MCHC RBC-ENTMCNC: 29.8 GM/DL
MCV RBC AUTO: 68.2 FL
PLATELET # BLD AUTO: 270 K/UL
PROT SERPL-MCNC: 7.8 G/DL
RBC # BLD: 6.94 M/UL
RBC # FLD: 17.3 %
WBC # FLD AUTO: 7.19 K/UL

## 2024-10-08 LAB — CALPROTECTIN FECAL: 14 UG/G

## 2024-11-25 NOTE — ASU PATIENT PROFILE, ADULT - FALL HARM RISK - UNIVERSAL INTERVENTIONS
Bed in lowest position, wheels locked, appropriate side rails in place/Call bell, personal items and telephone in reach/Instruct patient to call for assistance before getting out of bed or chair/Non-slip footwear when patient is out of bed/[place] to call system/Purposeful Proactive Rounding/Room/bathroom lighting operational, light cord in reach

## 2024-11-26 ENCOUNTER — APPOINTMENT (OUTPATIENT)
Age: 64
End: 2024-11-26

## 2024-11-26 ENCOUNTER — RESULT REVIEW (OUTPATIENT)
Age: 64
End: 2024-11-26

## 2024-11-26 ENCOUNTER — OUTPATIENT (OUTPATIENT)
Dept: OUTPATIENT SERVICES | Facility: HOSPITAL | Age: 64
LOS: 1 days | Discharge: HOME | End: 2024-11-26
Payer: MEDICAID

## 2024-11-26 VITALS
DIASTOLIC BLOOD PRESSURE: 81 MMHG | SYSTOLIC BLOOD PRESSURE: 126 MMHG | OXYGEN SATURATION: 96 % | RESPIRATION RATE: 12 BRPM | HEART RATE: 61 BPM

## 2024-11-26 VITALS
TEMPERATURE: 97 F | HEART RATE: 74 BPM | WEIGHT: 184.97 LBS | HEIGHT: 78 IN | OXYGEN SATURATION: 99 % | DIASTOLIC BLOOD PRESSURE: 89 MMHG | RESPIRATION RATE: 17 BRPM | SYSTOLIC BLOOD PRESSURE: 126 MMHG

## 2024-11-26 DIAGNOSIS — Z98.890 OTHER SPECIFIED POSTPROCEDURAL STATES: Chronic | ICD-10-CM

## 2024-11-26 DIAGNOSIS — K51.90 ULCERATIVE COLITIS, UNSPECIFIED, WITHOUT COMPLICATIONS: ICD-10-CM

## 2024-11-26 PROCEDURE — 88342 IMHCHEM/IMCYTCHM 1ST ANTB: CPT | Mod: 26

## 2024-11-26 PROCEDURE — 88305 TISSUE EXAM BY PATHOLOGIST: CPT | Mod: 26

## 2024-11-26 RX ORDER — 0.9 % SODIUM CHLORIDE 0.9 %
1000 INTRAVENOUS SOLUTION INTRAVENOUS
Refills: 0 | Status: DISCONTINUED | OUTPATIENT
Start: 2024-11-26 | End: 2024-12-10

## 2024-12-05 LAB — SURGICAL PATHOLOGY STUDY: SIGNIFICANT CHANGE UP

## 2025-03-06 RX ORDER — MESALAMINE 0.38 G/1
0.38 CAPSULE, EXTENDED RELEASE ORAL
Qty: 360 | Refills: 3 | Status: ACTIVE | COMMUNITY
Start: 2025-03-06 | End: 1900-01-01

## 2025-04-02 ENCOUNTER — APPOINTMENT (OUTPATIENT)
Dept: GASTROENTEROLOGY | Facility: CLINIC | Age: 65
End: 2025-04-02
Payer: MEDICAID

## 2025-04-02 VITALS
SYSTOLIC BLOOD PRESSURE: 138 MMHG | OXYGEN SATURATION: 96 % | DIASTOLIC BLOOD PRESSURE: 70 MMHG | RESPIRATION RATE: 14 BRPM | HEIGHT: 71 IN | HEART RATE: 71 BPM | BODY MASS INDEX: 26.18 KG/M2 | WEIGHT: 187 LBS

## 2025-04-02 PROCEDURE — 99213 OFFICE O/P EST LOW 20 MIN: CPT

## 2025-04-02 RX ORDER — MESALAMINE 0.38 G/1
0.38 CAPSULE, EXTENDED RELEASE ORAL
Qty: 360 | Refills: 3 | Status: ACTIVE | COMMUNITY
Start: 2025-04-02 | End: 1900-01-01

## 2025-04-09 RX ORDER — SODIUM SULFATE, POTASSIUM SULFATE AND MAGNESIUM SULFATE 1.6; 3.13; 17.5 G/177ML; G/177ML; G/177ML
17.5-3.13-1.6 SOLUTION ORAL
Qty: 1 | Refills: 0 | Status: ACTIVE | COMMUNITY
Start: 2025-04-02 | End: 1900-01-01

## 2025-06-09 RX ORDER — MESALAMINE 0.38 G/1
0.38 CAPSULE, EXTENDED RELEASE ORAL
Qty: 360 | Refills: 3 | Status: ACTIVE | COMMUNITY
Start: 2025-06-09 | End: 1900-01-01

## 2025-07-22 ENCOUNTER — OUTPATIENT (OUTPATIENT)
Dept: OUTPATIENT SERVICES | Facility: HOSPITAL | Age: 65
LOS: 1 days | End: 2025-07-22
Payer: MEDICAID

## 2025-07-22 ENCOUNTER — RESULT REVIEW (OUTPATIENT)
Age: 65
End: 2025-07-22

## 2025-07-22 ENCOUNTER — APPOINTMENT (OUTPATIENT)
Dept: GASTROENTEROLOGY | Facility: HOSPITAL | Age: 65
End: 2025-07-22

## 2025-07-22 VITALS
TEMPERATURE: 97 F | SYSTOLIC BLOOD PRESSURE: 126 MMHG | DIASTOLIC BLOOD PRESSURE: 79 MMHG | HEART RATE: 65 BPM | OXYGEN SATURATION: 98 % | RESPIRATION RATE: 19 BRPM

## 2025-07-22 VITALS
SYSTOLIC BLOOD PRESSURE: 126 MMHG | RESPIRATION RATE: 12 BRPM | HEART RATE: 52 BPM | DIASTOLIC BLOOD PRESSURE: 81 MMHG | OXYGEN SATURATION: 99 %

## 2025-07-22 DIAGNOSIS — Z98.890 OTHER SPECIFIED POSTPROCEDURAL STATES: Chronic | ICD-10-CM

## 2025-07-22 DIAGNOSIS — K51.90 ULCERATIVE COLITIS, UNSPECIFIED, WITHOUT COMPLICATIONS: ICD-10-CM

## 2025-07-22 PROCEDURE — 45380 COLONOSCOPY AND BIOPSY: CPT | Mod: 59

## 2025-07-22 PROCEDURE — 45385 COLONOSCOPY W/LESION REMOVAL: CPT

## 2025-07-22 PROCEDURE — 88305 TISSUE EXAM BY PATHOLOGIST: CPT | Mod: 26

## 2025-07-22 DEVICE — CLIP RESOLUTION 360 235CM: Type: IMPLANTABLE DEVICE | Status: FUNCTIONAL

## 2025-07-22 RX ORDER — SODIUM CHLORIDE 9 G/1000ML
1000 INJECTION, SOLUTION INTRAVENOUS
Refills: 0 | Status: DISCONTINUED | OUTPATIENT
Start: 2025-07-22 | End: 2025-08-05

## 2025-07-25 LAB — SURGICAL PATHOLOGY STUDY: SIGNIFICANT CHANGE UP

## 2025-08-07 ENCOUNTER — NON-APPOINTMENT (OUTPATIENT)
Age: 65
End: 2025-08-07

## (undated) DEVICE — TUBING MEDI-VAC W MAXIGRIP CONNECTORS 1/4"X6'

## (undated) DEVICE — CONTAINER FORMALIN 80ML YELLOW

## (undated) DEVICE — BIOPSY FORCEP COLD DISP

## (undated) DEVICE — TUBING SUCTION NONCONDUCTIVE 6MM X 12FT

## (undated) DEVICE — LINE BREATHE SAMPLNG

## (undated) DEVICE — BIOPSY FORCEP RADIAL JAW 4 STANDARD WITH NEEDLE

## (undated) DEVICE — CATH IV SAFE BC 22G X 1" (BLUE)

## (undated) DEVICE — BASIN EMESIS 10IN GRADUATED MAUVE

## (undated) DEVICE — LUBRICATING JELLY HR ONE SHOT 3G

## (undated) DEVICE — ELCTR GROUNDING PAD ADULT COVIDIEN

## (undated) DEVICE — PACK IV START WITH CHG

## (undated) DEVICE — DRSG 2X2

## (undated) DEVICE — GOWN LG

## (undated) DEVICE — DRSG BANDAID 0.75X3"

## (undated) DEVICE — POLY TRAP ETRAP

## (undated) DEVICE — TUBING IV SET GRAVITY 3Y 100" MACRO

## (undated) DEVICE — SALIVA EJECTOR (BLUE)

## (undated) DEVICE — CONTAINER FORMALIN 10% 60ML

## (undated) DEVICE — ELCTR ECG CONDUCTIVE ADHESIVE

## (undated) DEVICE — DRSG CURITY GAUZE SPONGE 4 X 4" 12-PLY NON-STERILE

## (undated) DEVICE — SNARE LESIONHUNTER ROTAT NITNL COLD 10MM

## (undated) DEVICE — FACESHIELD FULL VISOR